# Patient Record
Sex: MALE | Employment: OTHER | URBAN - METROPOLITAN AREA
[De-identification: names, ages, dates, MRNs, and addresses within clinical notes are randomized per-mention and may not be internally consistent; named-entity substitution may affect disease eponyms.]

---

## 2017-01-13 ENCOUNTER — TELEPHONE (OUTPATIENT)
Dept: INTERNAL MEDICINE CLINIC | Age: 82
End: 2017-01-13

## 2017-01-13 NOTE — TELEPHONE ENCOUNTER
Patients son, Ann Reynolds,  called concerned for his father. His dementia has gotten worse and now his vision is being affected. He would like for Dr Jill Santos to give him a call back so he can explain to him what is going on with his father.   Please call him at [phone]

## 2017-01-16 ENCOUNTER — TELEPHONE (OUTPATIENT)
Dept: INTERNAL MEDICINE CLINIC | Age: 82
End: 2017-01-16

## 2017-01-16 NOTE — TELEPHONE ENCOUNTER
Called and spoke with son and informed him that per Dr Joseline Moser patient needs to be seen and he could come into the office if able or Dr Joseline Moser could do a house call. Son voiced understanding and agreed to either but stated that he could not bring patient in until next week. Appointment has been scheduled for 1/23 at 945 and if Dr Joseline Moser would like to do a house call on patient before that date son will be made aware.

## 2017-01-16 NOTE — TELEPHONE ENCOUNTER
Patients son would like Dr Olivas Enter to call concerning the patient .       Hermilo Blanton  [phone]

## 2017-01-23 ENCOUNTER — HOSPITAL ENCOUNTER (OUTPATIENT)
Dept: LAB | Age: 82
Discharge: HOME | End: 2017-01-23
Payer: MEDICARE

## 2017-01-23 ENCOUNTER — OFFICE VISIT (OUTPATIENT)
Dept: INTERNAL MEDICINE CLINIC | Age: 82
End: 2017-01-23

## 2017-01-23 DIAGNOSIS — E03.9 ACQUIRED HYPOTHYROIDISM: ICD-10-CM

## 2017-01-23 DIAGNOSIS — R41.3 MEMORY LOSS: ICD-10-CM

## 2017-01-23 DIAGNOSIS — I10 BENIGN ESSENTIAL HYPERTENSION: ICD-10-CM

## 2017-01-23 DIAGNOSIS — Z00.00 ROUTINE GENERAL MEDICAL EXAMINATION AT A HEALTH CARE FACILITY: ICD-10-CM

## 2017-01-23 DIAGNOSIS — R41.3 MEMORY LOSS: Primary | ICD-10-CM

## 2017-01-23 DIAGNOSIS — Z13.39 SCREENING FOR ALCOHOLISM: ICD-10-CM

## 2017-01-23 DIAGNOSIS — E78.00 PURE HYPERCHOLESTEROLEMIA: ICD-10-CM

## 2017-01-23 LAB
ALBUMIN SERPL BCP-MCNC: 3.8 G/DL (ref 3.4–5)
ALBUMIN/GLOB SERPL: 1.4 {RATIO} (ref 0.8–1.7)
ALP SERPL-CCNC: 117 U/L (ref 45–117)
ALT SERPL-CCNC: 26 U/L (ref 16–61)
ANION GAP BLD CALC-SCNC: 8 MMOL/L (ref 3–18)
AST SERPL W P-5'-P-CCNC: 13 U/L (ref 15–37)
BASOPHILS # BLD AUTO: 0 K/UL (ref 0–0.06)
BASOPHILS # BLD: 0 % (ref 0–2)
BILIRUB SERPL-MCNC: 0.6 MG/DL (ref 0.2–1)
BUN SERPL-MCNC: 12 MG/DL (ref 7–18)
BUN/CREAT SERPL: 12 (ref 12–20)
CALCIUM SERPL-MCNC: 8.6 MG/DL (ref 8.5–10.1)
CHLORIDE SERPL-SCNC: 98 MMOL/L (ref 100–108)
CO2 SERPL-SCNC: 30 MMOL/L (ref 21–32)
CREAT SERPL-MCNC: 0.97 MG/DL (ref 0.6–1.3)
DIFFERENTIAL METHOD BLD: ABNORMAL
EOSINOPHIL # BLD: 0.2 K/UL (ref 0–0.4)
EOSINOPHIL NFR BLD: 4 % (ref 0–5)
ERYTHROCYTE [DISTWIDTH] IN BLOOD BY AUTOMATED COUNT: 13.3 % (ref 11.6–14.5)
GLOBULIN SER CALC-MCNC: 2.8 G/DL (ref 2–4)
GLUCOSE SERPL-MCNC: 77 MG/DL (ref 74–99)
HCT VFR BLD AUTO: 39.9 % (ref 36–48)
HGB BLD-MCNC: 13.1 G/DL (ref 13–16)
LYMPHOCYTES # BLD AUTO: 20 % (ref 21–52)
LYMPHOCYTES # BLD: 0.9 K/UL (ref 0.9–3.6)
MCH RBC QN AUTO: 33.8 PG (ref 24–34)
MCHC RBC AUTO-ENTMCNC: 32.8 G/DL (ref 31–37)
MCV RBC AUTO: 102.8 FL (ref 74–97)
MONOCYTES # BLD: 0.4 K/UL (ref 0.05–1.2)
MONOCYTES NFR BLD AUTO: 8 % (ref 3–10)
NEUTS SEG # BLD: 3 K/UL (ref 1.8–8)
NEUTS SEG NFR BLD AUTO: 68 % (ref 40–73)
PLATELET # BLD AUTO: 134 K/UL (ref 135–420)
PMV BLD AUTO: 10.9 FL (ref 9.2–11.8)
POTASSIUM SERPL-SCNC: 3.9 MMOL/L (ref 3.5–5.5)
PROT SERPL-MCNC: 6.6 G/DL (ref 6.4–8.2)
RBC # BLD AUTO: 3.88 M/UL (ref 4.7–5.5)
SODIUM SERPL-SCNC: 136 MMOL/L (ref 136–145)
TSH SERPL DL<=0.05 MIU/L-ACNC: 3.73 UIU/ML (ref 0.36–3.74)
WBC # BLD AUTO: 4.5 K/UL (ref 4.6–13.2)

## 2017-01-23 PROCEDURE — 80053 COMPREHEN METABOLIC PANEL: CPT | Performed by: INTERNAL MEDICINE

## 2017-01-23 PROCEDURE — 85025 COMPLETE CBC W/AUTO DIFF WBC: CPT | Performed by: INTERNAL MEDICINE

## 2017-01-23 PROCEDURE — 84443 ASSAY THYROID STIM HORMONE: CPT | Performed by: INTERNAL MEDICINE

## 2017-01-23 RX ORDER — MEMANTINE HYDROCHLORIDE 14 MG/1
CAPSULE, EXTENDED RELEASE ORAL
Qty: 30 CAP | Refills: 2 | Status: SHIPPED | OUTPATIENT
Start: 2017-01-23 | End: 2019-03-05 | Stop reason: ALTCHOICE

## 2017-01-23 NOTE — PROGRESS NOTES
The patient presents to the office today with the chief complaint of memory loss    HPI    The patient has memory loss most consistent with Alzheimer's Disease. The patient's mental status is deteriorating. The patient remains on medications for hypothyroidism and hypertension. The patient has hyperlipidemia - which currently is being treated with a diet. Review of Systems   Constitutional: Positive for malaise/fatigue. Respiratory: Negative for shortness of breath. Cardiovascular: Positive for leg swelling. Negative for chest pain. Allergies   Allergen Reactions    Avelox [Moxifloxacin] Rash    Clindamycin Rash    Penicillin V Potassium Rash       Current Outpatient Prescriptions   Medication Sig Dispense Refill    amLODIPine (NORVASC) 5 mg tablet take 1 tablet by mouth once daily 30 Tab 2    levothyroxine (SYNTHROID) 25 mcg tablet take 1 tablet by mouth every morning ON AN EMPTY STOMACH 30 Tab 2    FLUZONE HIGH-DOSE 2015-16, PF, syrg injection   0       Past Medical History   Diagnosis Date    Asbestosis ([facility] [place] [address]     Benign essential hypertension     Hyperplasia of prostate with urinary obstruction     Memory loss     Pure hypercholesterolemia        Past Surgical History   Procedure Laterality Date    Hx appendectomy      Hx cholecystectomy  7/8/1997    Hx other surgical  10/1999     repair of abdominal aortic aneursym       Social History     Social History    Marital status: UNKNOWN     Spouse name: N/A    Number of children: N/A    Years of education: N/A     Occupational History    Not on file. Social History Main Topics    Smoking status: Former Smoker    Smokeless tobacco: Never Used    Alcohol use No    Drug use: No    Sexual activity: No     Other Topics Concern    Not on file     Social History Narrative       Patient does not have an advanced directive on file    There were no vitals taken for this visit.     Physical Exam   Neck: Carotid bruit is not present. No thyromegaly present. Cardiovascular: Normal rate and regular rhythm. Exam reveals no gallop. No murmur heard. Pulmonary/Chest: He has no wheezes. He has no rales. Abdominal: Soft. Bowel sounds are normal. He exhibits no distension and no mass. There is no splenomegaly or hepatomegaly. There is no tenderness. Musculoskeletal: He exhibits no edema. Lymphadenopathy:     He has no cervical adenopathy. Right: No supraclavicular adenopathy present. Left: No supraclavicular adenopathy present. No visits with results within 3 Month(s) from this visit. Latest known visit with results is:    Hospital Outpatient Visit on 06/13/2016   Component Date Value Ref Range Status    LIPID PROFILE 06/13/2016        Final    Cholesterol, total 06/13/2016 165  <200 MG/DL Final    Triglyceride 06/13/2016 138  <150 MG/DL Final    HDL Cholesterol 06/13/2016 40  40 - 60 MG/DL Final    LDL, calculated 06/13/2016 97.4  0 - 100 MG/DL Final    VLDL, calculated 06/13/2016 27.6  MG/DL Final    CHOL/HDL Ratio 06/13/2016 4.1  0 - 5.0   Final    WBC 06/13/2016 5.5  4.6 - 13.2 K/uL Final    RBC 06/13/2016 4.08* 4.70 - 5.50 M/uL Final    HGB 06/13/2016 13.2  13.0 - 16.0 g/dL Final    HCT 06/13/2016 41.3  36.0 - 48.0 % Final    MCV 06/13/2016 101.2* 74.0 - 97.0 FL Final    MCH 06/13/2016 32.4  24.0 - 34.0 PG Final    MCHC 06/13/2016 32.0  31.0 - 37.0 g/dL Final    RDW 06/13/2016 13.0  11.6 - 14.5 % Final    PLATELET 56/72/4851 509* 135 - 420 K/uL Final    MPV 06/13/2016 11.7  9.2 - 11.8 FL Final    NEUTROPHILS 06/13/2016 63  40 - 73 % Final    LYMPHOCYTES 06/13/2016 21  21 - 52 % Final    MONOCYTES 06/13/2016 12* 3 - 10 % Final    EOSINOPHILS 06/13/2016 4  0 - 5 % Final    BASOPHILS 06/13/2016 0  0 - 2 % Final    ABS. NEUTROPHILS 06/13/2016 3.5  1.8 - 8.0 K/UL Final    ABS. LYMPHOCYTES 06/13/2016 1.2  0.9 - 3.6 K/UL Final    ABS.  MONOCYTES 06/13/2016 0.7  0.05 - 1.2 K/UL Final    ABS. EOSINOPHILS 06/13/2016 0.2  0.0 - 0.4 K/UL Final    ABS. BASOPHILS 06/13/2016 0.0  0.0 - 0.06 K/UL Final    DF 06/13/2016 AUTOMATED    Final    Sodium 06/13/2016 140  136 - 145 mmol/L Final    Potassium 06/13/2016 4.5  3.5 - 5.5 mmol/L Final    Chloride 06/13/2016 101  100 - 108 mmol/L Final    CO2 06/13/2016 32  21 - 32 mmol/L Final    Anion gap 06/13/2016 7  3.0 - 18 mmol/L Final    Glucose 06/13/2016 83  74 - 99 mg/dL Final    BUN 06/13/2016 15  7.0 - 18 MG/DL Final    Creatinine 06/13/2016 0.95  0.6 - 1.3 MG/DL Final    BUN/Creatinine ratio 06/13/2016 16  12 - 20   Final    GFR est AA 06/13/2016 >60  >60 ml/min/1.73m2 Final    GFR est non-AA 06/13/2016 >60  >60 ml/min/1.73m2 Final    Comment: (NOTE)  Estimated GFR is calculated using the Modification of Diet in Renal   Disease (MDRD) Study equation, reported for both  Americans   (GFRAA) and non- Americans (GFRNA), and normalized to 1.73m2   body surface area. The physician must decide which value applies to   the patient. The MDRD study equation should only be used in   individuals age 25 or older. It has not been validated for the   following: pregnant women, patients with serious comorbid conditions,   or on certain medications, or persons with extremes of body size,   muscle mass, or nutritional status.  Calcium 06/13/2016 8.7  8.5 - 10.1 MG/DL Final    Bilirubin, total 06/13/2016 0.5  0.2 - 1.0 MG/DL Final    ALT 06/13/2016 21  16 - 61 U/L Final    AST 06/13/2016 16  15 - 37 U/L Final    Alk. phosphatase 06/13/2016 98  45 - 117 U/L Final    Protein, total 06/13/2016 6.5  6.4 - 8.2 g/dL Final    Albumin 06/13/2016 3.8  3.4 - 5.0 g/dL Final    Globulin 06/13/2016 2.7  2.0 - 4.0 g/dL Final    A-G Ratio 06/13/2016 1.4  0.8 - 1.7   Final    TSH 06/13/2016 4.98* 0.36 - 3.74 uIU/mL Final       .No results found for any visits on 01/23/17. Assessment / Plan      ICD-10-CM ICD-9-CM    1. Memory loss R41.3 780.93    2. Benign essential hypertension I10 401.1    3. Pure hypercholesterolemia E78.00 272.0    4. Acquired hypothyroidism E03.9 244.9            Follow-up Disposition: Not on File    I asked the patient and his son for any questions and I answered their questions.   The patient and his son state that they understand the treatment plan and agree with the treatment plan

## 2017-01-23 NOTE — PATIENT INSTRUCTIONS
Medicare Part B Preventive Services Limitations Recommendation Scheduled   Bone Mass Measurement  (age 72 & older, biennial) Requires diagnosis related to osteoporosis or estrogen deficiency. Biennial benefit unless patient has history of long-term glucocorticoid tx or baseline is needed because initial test was by other method     Cardiovascular Screening Blood Tests (every 5 years)  Total cholesterol, HDL, Triglycerides Order as a panel if possible     Colorectal Cancer Screening  -Fecal occult blood test (annual)  -Flexible sigmoidoscopy (5y)  -Screening colonoscopy (10y)  -Barium Enema      Counseling to Prevent Tobacco Use (up to 8 sessions per year)  - Counseling greater than 3 and up to 10 minutes  - Counseling greater than 10 minutes Patients must be asymptomatic of tobacco-related conditions to receive as preventive service     Diabetes Screening Tests (at least every 3 years, Medicare covers annually or at 6-month intervals for prediabetic patients)    Fasting blood sugar (FBS) or glucose tolerance test (GTT) Patient must be diagnosed with one of the following:  -Hypertension, Dyslipidemia, obesity, previous impaired FBS or GTT  Or any two of the following: overweight, FH of diabetes, age ? 72, history of gestational diabetes, birth of baby weighing more than 9 pounds     Diabetes Self-Management Training (DSMT) (no USPSTF recommendation) Requires referral by treating physician for patient with diabetes or renal disease. 10 hours of initial DSMT session of no less than 30 minutes each in a continuous 12-month period. 2 hours of follow-up DSMT in subsequent years.      Glaucoma Screening (no USPSTF recommendation) Diabetes mellitus, family history, , age 48 or over,  American, age 72 or over     Human Immunodeficiency Virus (HIV) Screening (annually for increased risk patients)  HIV-1 and HIV-2 by EIA, TABATHA, rapid antibody test, or oral mucosa transudate Patient must be at increased risk for HIV infection per USPSTF guidelines or pregnant. Tests covered annually for patients at increased risk. Pregnant patients may receive up to 3 test during pregnancy. Medical Nutrition Therapy (MNT) (for diabetes or renal disease not recommended schedule) Requires referral by treating physician for patient with diabetes or renal disease. Can be provided in same year as diabetes self-management training (DSMT), and CMS recommends medical nutrition therapy take place after DSMT. Up to 3 hours for initial year and 2 hours in subsequent years. Prostate Cancer Screening (annually up to age 76)  - Digital rectal exam (BRANDY)  - Prostate specific antigen (PSA) Annually (age 48 or over), BRANDY not paid separately when covered E/M service is provided on same date  Men up to age 76 may need a screening blood test for prostate cancer at certain intervals, depending on their personal and family history. This decision is between the patient and his provider. Seasonal Influenza Vaccination (annually)        Pneumococcal Vaccination (once after 72)      Hepatitis B Vaccinations (if medium/high risk) Medium/high risk factors:  End-stage renal disease,  Hemophiliacs who received Factor VIII or IX concentrates, Clients of institutions for the mentally retarded, Persons who live in the same house as a HepB virus carrier, Homosexual men, Illicit injectable drug abusers. Shingles Vaccination A shingles vaccine is also recommended once in a lifetime after age 61     Ultrasound Screening for Abdominal Aortic Aneurysm (AAA) (once) Patient must be referred through [facility] and not have had a screening for abdominal aortic aneurysm before under Medicare.   Limited to patients who meet one of the following criteria:  - Men who are 73-68 years old and have smoked more than 100 cigarettes in their lifetime.  -Anyone with a FH of AAA  -Anyone recommended for screening by USPSTF

## 2017-01-23 NOTE — MR AVS SNAPSHOT
Visit Information Date & Time Provider Department Dept. Phone Encounter #  
 1/23/2017  9:45 AM Tatiana Johnson MD [facility] INTERNAL MEDICINE OF [address] [place] [address] Drive [phone] [id number] Your Appointments 5/15/2017 10:30 AM  
Follow Up with Tatiana Johnson MD  
[address] [facility]) Appt Note: 5mo  
 [address], [address]  
  
   
 [address], [address] [place] [address] Upcoming Health Maintenance Date Due DTaP/Tdap/Td series (1 - Tdap) 9/13/1943 ZOSTER VACCINE AGE 60> 9/13/1982 GLAUCOMA SCREENING Q2Y 9/13/1987 Pneumococcal 65+ Low/Medium Risk (1 of 2 - PCV13) 9/13/1987 MEDICARE YEARLY EXAM 9/13/1987 Allergies as of 1/23/2017  Review Complete On: 1/23/2017 By: Tatiana Johnson MD  
  
 Severity Noted Reaction Type Reactions Avelox [Moxifloxacin]    Rash Clindamycin    Rash Penicillin V Potassium    Rash Current Immunizations  Never Reviewed Name Date Influenza High Dose Vaccine PF 10/11/2016 Not reviewed this visit You Were Diagnosed With   
  
 Codes Comments Memory loss    -  Primary ICD-10-CM: R41.3 ICD-9-CM: 780.93 Benign essential hypertension     ICD-10-CM: I10 
ICD-9-CM: 401.1 Pure hypercholesterolemia     ICD-10-CM: E78.00 ICD-9-CM: 272.0 Acquired hypothyroidism     ICD-10-CM: E03.9 ICD-9-CM: 334. 9 Vitals Smoking Status Former Smoker Preferred Pharmacy Pharmacy Name Phone [address], [address] [place] [address] [phone] Your Updated Medication List  
  
   
This list is accurate as of: 1/23/17 11:33 AM.  Always use your most recent med list. amLODIPine 5 mg tablet Commonly known as:  NORVASC  
take 1 tablet by mouth once daily FLUZONE HIGH-DOSE 2015-16 (PF) Syrg injection Generic drug:  influenza vaccine  (65yr+)(PF)  
  
 levothyroxine 25 mcg tablet Commonly known as:  SYNTHROID  
take 1 tablet by mouth every morning ON AN EMPTY STOMACH  
  
 memantine ER 14 mg capsule Commonly known as:  NAMENDA XR  
1 tablet daily Prescriptions Sent to Pharmacy Refills  
 memantine ER (NAMENDA XR) 14 mg capsule 2 Si tablet daily Class: Normal  
 [facility] #: [phone] Introducing [facility]! Simón Terrazas introduces PicRate.Me patient portal. Now you can access parts of your medical record, email your doctor's office, and request medication refills online. 1. In your internet browser, go to [email or web address] 2. Click on the First Time User? Click Here link in the Sign In box. You will see the New Member Sign Up page. 3. Enter your PicRate.Me Access Code exactly as it appears below. You will not need to use this code after youve completed the sign-up process. If you do not sign up before the expiration date, you must request a new code. · PicRate.Me Access Code: [email or web address] Expires: 2017 11:26 AM 
 
4. Enter the last four digits of your Social Security Number (xxxx) and Date of Birth (mm/dd/yyyy) as indicated and click Submit. You will be taken to the next sign-up page. 5. Create a PicRate.Me ID. This will be your PicRate.Me login ID and cannot be changed, so think of one that is secure and easy to remember. 6. Create a PicRate.Me password. You can change your password at any time. 7. Enter your Password Reset Question and Answer. This can be used at a later time if you forget your password. 8. Enter your e-mail address. You will receive e-mail notification when new information is available in [address] 9. Click Sign Up. You can now view and download portions of your medical record. 10. Click the Download Summary menu link to download a portable copy of your medical information. If you have questions, please visit the Frequently Asked Questions section of the freee website. Remember, freee is NOT to be used for urgent needs. For medical emergencies, dial 911. Now available from your iPhone and Android! Please provide this summary of care documentation to your next provider. Your primary care clinician is listed as Karlos Camacho. If you have any questions after today's visit, please call [phone].

## 2017-01-23 NOTE — PROGRESS NOTES
This is a Subsequent Medicare Annual Wellness Visit providing Personalized Prevention Plan Services (PPPS) (Performed 12 months after initial AWV and PPPS )    I have reviewed the patient's medical history in detail and updated the computerized patient record. History     Patient has worsening dementia. His immediate memory is bad. He is often asking when his  wife will return. The patient in on medications for hypothyroidism and hypertension. The patient's family makes sure that he takes them properly.       Past Medical History   Diagnosis Date    [facility]bestosis ([facility] [address]     Benign essential hypertension     Hyperplasia of prostate with urinary obstruction     Memory loss     Pure hypercholesterolemia       Past Surgical History   Procedure Laterality Date    Hx appendectomy      Hx cholecystectomy  1997    Hx other surgical  10/1999     repair of abdominal aortic aneursym     Current Outpatient Prescriptions   Medication Sig Dispense Refill    memantine ER (NAMENDA XR) 14 mg capsule 1 tablet daily 30 Cap 2    amLODIPine (NORVASC) 5 mg tablet take 1 tablet by mouth once daily 30 Tab 2    levothyroxine (SYNTHROID) 25 mcg tablet take 1 tablet by mouth every morning ON AN EMPTY STOMACH 30 Tab 2    FLUZONE HIGH-DOSE , PF, syrg injection   0     Allergies   Allergen Reactions    Avelox [Moxifloxacin] Rash    Clindamycin Rash    Penicillin V Potassium Rash     Family History   Problem Relation Age of Onset    No Known Problems Mother     No Known Problems Father      Social History   Substance Use Topics    Smoking status: Former Smoker    Smokeless tobacco: Never Used    Alcohol use No     Patient Active Problem List   Diagnosis Code    Memory loss R41.3    Hyperplasia of prostate with urinary obstruction N40.1, N13.8    Asbestosis ([address] J61    Benign essential hypertension I10    Pure hypercholesterolemia E78.00    Acquired hypothyroidism E03.9       Depression Risk Factor Screening:     PHQ 2 / 9, over the last two weeks 1/24/2017   Little interest or pleasure in doing things Several days   Feeling down, depressed or hopeless Several days   Total Score PHQ 2 2     Alcohol Risk Factor Screening: On any occasion during the past 3 months, have you had more than 4 drinks containing alcohol? No    Do you average more than 14 drinks per week? No      Functional Ability and Level of Safety:     Hearing Loss   mild-to-moderate    Activities of Daily Living   Self-care. Requires assistance with: no ADLs    Fall Risk     Fall Risk Assessment, last 12 mths 6/3/2016   Able to walk? Yes   Fall in past 12 months? No   Fall with injury? -   Number of falls in past 12 months -   Fall Risk Score -     Abuse Screen   Patient is not abused    Review of Systems   A comprehensive review of systems was negative except for that written in the HPI. Physical Examination     Evaluation of Cognitive Function:  Mood/affect:  neutral  Appearance: age appropriate  Family member/caregiver input: Son    Visit Vitals    /78 (BP 1 Location: Left arm, BP Patient Position: Sitting)    Pulse 68    Ht 5' 4\" (1.626 m)    Wt 152 lb (68.9 kg)    BMI 26.09 kg/m2     General appearance: appears stated age  Neck: supple, symmetrical, trachea midline, no adenopathy, thyroid: not enlarged, symmetric, no tenderness/mass/nodules, no carotid bruit and no JVD  Back: symmetric, no curvature. ROM normal. No CVA tenderness.   Lungs: clear to auscultation bilaterally  Chest wall: no tenderness  Heart: regular rate and rhythm, S1, S2 normal, no murmur, click, rub or gallop  Extremities: extremities normal, atraumatic, no cyanosis or edema  Pulses: 2+ and symmetric  Lymph nodes: Cervical, supraclavicular, and axillary nodes normal.    Patient Care Team:  Romie Adam MD as PCP - General (Internal Medicine)    Advice/Referrals/Counseling   Education and counseling provided:  Are appropriate based on today's review and evaluation  The patient and son were advised and counseled regarding advanced directives  Assessment/Plan       ICD-10-CM ICD-9-CM    1. Memory loss R41.3 780.93 CBC WITH AUTOMATED DIFF      METABOLIC PANEL, COMPREHENSIVE      TSH 3RD GENERATION      FL COLLECTION VENOUS BLOOD,VENIPUNCTURE   2. Benign essential hypertension I10 401.1 CBC WITH AUTOMATED DIFF      METABOLIC PANEL, COMPREHENSIVE      TSH 3RD GENERATION      FL COLLECTION VENOUS BLOOD,VENIPUNCTURE   3. Pure hypercholesterolemia E78.00 272.0 CBC WITH AUTOMATED DIFF      METABOLIC PANEL, COMPREHENSIVE      TSH 3RD GENERATION      FL COLLECTION VENOUS BLOOD,VENIPUNCTURE   4. Acquired hypothyroidism E03.9 244.9 CBC WITH AUTOMATED DIFF      METABOLIC PANEL, COMPREHENSIVE      TSH 3RD GENERATION      FL COLLECTION VENOUS BLOOD,VENIPUNCTURE   5. Routine general medical examination at a health care facility Z00.00 V70.0 FL COLLECTION VENOUS BLOOD,VENIPUNCTURE   6. Screening for alcoholism Z13.89 V79.1 FL COLLECTION VENOUS BLOOD,VENIPUNCTURE   . Add Namenda  he was advised to continue his maintenance medications  I asked the patient and his son for any questions and I answered their questions.   The patient and his osn state that they understand the treatment plan and agree with the treatment plan

## 2017-01-24 VITALS
BODY MASS INDEX: 25.95 KG/M2 | HEART RATE: 68 BPM | DIASTOLIC BLOOD PRESSURE: 78 MMHG | HEIGHT: 64 IN | SYSTOLIC BLOOD PRESSURE: 132 MMHG | WEIGHT: 152 LBS

## 2017-02-21 DIAGNOSIS — E03.9 HYPOTHYROIDISM, UNSPECIFIED TYPE: ICD-10-CM

## 2017-02-21 DIAGNOSIS — I10 BENIGN ESSENTIAL HYPERTENSION: ICD-10-CM

## 2017-02-21 RX ORDER — AMLODIPINE BESYLATE 5 MG/1
TABLET ORAL
Qty: 30 TAB | Refills: 2 | Status: SHIPPED | OUTPATIENT
Start: 2017-02-21 | End: 2017-05-24 | Stop reason: SDUPTHER

## 2017-02-21 RX ORDER — LEVOTHYROXINE SODIUM 25 UG/1
TABLET ORAL
Qty: 30 TAB | Refills: 2 | Status: SHIPPED | OUTPATIENT
Start: 2017-02-21 | End: 2017-05-24 | Stop reason: SDUPTHER

## 2017-05-09 RX ORDER — MEMANTINE HYDROCHLORIDE 10 MG/1
TABLET ORAL
Qty: 60 TAB | Refills: 3 | Status: SHIPPED | OUTPATIENT
Start: 2017-05-09 | End: 2017-11-22 | Stop reason: SDUPTHER

## 2017-05-09 RX ORDER — MEMANTINE HYDROCHLORIDE 14 MG/1
CAPSULE, EXTENDED RELEASE ORAL
Qty: 30 CAP | Refills: 2 | OUTPATIENT
Start: 2017-05-09

## 2017-05-19 ENCOUNTER — CLINICAL SUPPORT (OUTPATIENT)
Dept: INTERNAL MEDICINE CLINIC | Age: 82
End: 2017-05-19

## 2017-05-19 VITALS — SYSTOLIC BLOOD PRESSURE: 150 MMHG | DIASTOLIC BLOOD PRESSURE: 78 MMHG

## 2017-05-19 DIAGNOSIS — I10 BENIGN ESSENTIAL HYPERTENSION: Primary | ICD-10-CM

## 2017-05-19 NOTE — PROGRESS NOTES
Patient came into the office today for a blood pressure check. Blood pressure was checked in the right arm at 150/78. Patient able to leave office ambulatory.

## 2017-05-24 DIAGNOSIS — E03.9 HYPOTHYROIDISM, UNSPECIFIED TYPE: ICD-10-CM

## 2017-05-24 DIAGNOSIS — I10 BENIGN ESSENTIAL HYPERTENSION: ICD-10-CM

## 2017-05-24 RX ORDER — LEVOTHYROXINE SODIUM 25 UG/1
TABLET ORAL
Qty: 30 TAB | Refills: 2 | Status: SHIPPED | OUTPATIENT
Start: 2017-05-24 | End: 2017-08-26 | Stop reason: SDUPTHER

## 2017-05-24 RX ORDER — AMLODIPINE BESYLATE 5 MG/1
TABLET ORAL
Qty: 30 TAB | Refills: 2 | Status: SHIPPED | OUTPATIENT
Start: 2017-05-24 | End: 2017-08-26 | Stop reason: SDUPTHER

## 2017-08-26 DIAGNOSIS — E03.9 HYPOTHYROIDISM, UNSPECIFIED TYPE: ICD-10-CM

## 2017-08-26 DIAGNOSIS — I10 BENIGN ESSENTIAL HYPERTENSION: ICD-10-CM

## 2017-08-28 RX ORDER — LEVOTHYROXINE SODIUM 25 UG/1
TABLET ORAL
Qty: 30 TAB | Refills: 2 | Status: SHIPPED | OUTPATIENT
Start: 2017-08-28 | End: 2017-11-19 | Stop reason: SDUPTHER

## 2017-08-28 RX ORDER — AMLODIPINE BESYLATE 5 MG/1
TABLET ORAL
Qty: 30 TAB | Refills: 2 | Status: SHIPPED | OUTPATIENT
Start: 2017-08-28 | End: 2017-11-19 | Stop reason: SDUPTHER

## 2017-09-19 ENCOUNTER — OFFICE VISIT (OUTPATIENT)
Dept: INTERNAL MEDICINE CLINIC | Age: 82
End: 2017-09-19

## 2017-09-19 VITALS
TEMPERATURE: 97.9 F | DIASTOLIC BLOOD PRESSURE: 70 MMHG | HEART RATE: 81 BPM | HEIGHT: 64 IN | BODY MASS INDEX: 25.95 KG/M2 | RESPIRATION RATE: 16 BRPM | WEIGHT: 152 LBS | SYSTOLIC BLOOD PRESSURE: 142 MMHG | OXYGEN SATURATION: 100 %

## 2017-09-19 DIAGNOSIS — Z23 ENCOUNTER FOR IMMUNIZATION: ICD-10-CM

## 2017-09-19 DIAGNOSIS — R51.9 HEADACHE, UNSPECIFIED HEADACHE TYPE: Primary | ICD-10-CM

## 2017-09-19 DIAGNOSIS — I10 BENIGN ESSENTIAL HYPERTENSION: ICD-10-CM

## 2017-09-19 DIAGNOSIS — R41.3 MEMORY LOSS: ICD-10-CM

## 2017-09-19 RX ORDER — MEGESTROL ACETATE 40 MG/1
TABLET ORAL
Qty: 30 TAB | Refills: 3 | Status: SHIPPED | OUTPATIENT
Start: 2017-09-19 | End: 2020-06-25

## 2017-09-19 NOTE — MR AVS SNAPSHOT
Visit Information Date & Time Provider Department Dept. Phone Encounter #  
 9/19/2017 10:15 AM Marleny Mccrary MD [facility] INTERNAL MEDICINE OF Glynis Pallas [phone] [id number] Upcoming Health Maintenance Date Due DTaP/Tdap/Td series (1 - Tdap) 9[birth year]13/1943 ZOSTER VACCINE AGE 60> 7/13/1982 GLAUCOMA SCREENING Q2Y 9/13/1987 Pneumococcal 65+ Low/Medium Risk (2 of 2 - PCV13) 10/12/2006 INFLUENZA AGE 9 TO ADULT 8/1/2017 MEDICARE YEARLY EXAM 1/24/2018 Allergies as of 9/19/2017  Review Complete On: 9/19/2017 By: Bela Foote LPN Severity Noted Reaction Type Reactions Avelox [Moxifloxacin]    Rash Clindamycin    Rash Penicillin V Potassium    Rash Current Immunizations  Reviewed on 5/10/2017 Name Date Influenza High Dose Vaccine PF  Incomplete, 10/11/2016 Pneumococcal Polysaccharide (PPSV-23) 10/12/2005 Not reviewed this visit You Were Diagnosed With   
  
 Codes Comments Headache, unspecified headache type    -  Primary ICD-10-CM: R51 ICD-9-CM: 784.0 Encounter for immunization     ICD-10-CM: H84 ICD-9-CM: V03.89 Memory loss     ICD-10-CM: R41.3 ICD-9-CM: 780.93 Benign essential hypertension     ICD-10-CM: I10 
ICD-9-CM: 401.1 Vitals BP Pulse Temp Resp Height(growth percentile) 142/70 (BP 1 Location: Left arm, BP Patient Position: Sitting) 81 97.9 °F (36.6 °C) (Tympanic) 16 5' 4\" (1.626 m) Weight(growth percentile) SpO2 BMI Smoking Status 152 lb (68.9 kg) 100% 26.09 kg/m2 Former Smoker BMI and BSA Data Body Mass Index Body Surface Area 26.09 kg/m 2 1.76 m 2 Preferred Pharmacy Pharmacy Name Phone [address], [address] [place] [address] [phone] Your Updated Medication List  
  
   
This list is accurate as of: 9/19/17 11:47 AM.  Always use your most recent med list. amLODIPine 5 mg tablet Commonly known as:  NORVASC  
take 1 tablet by mouth once daily FLUZONE HIGH-DOSE  (PF) Syrg injection Generic drug:  influenza vaccine  (65yr+)(PF)  
  
 levothyroxine 25 mcg tablet Commonly known as:  SYNTHROID  
take 1 tablet by mouth every morning ON AN EMPTY STOMACH  
  
 megestrol 40 mg tablet Commonly known as:  MEGACE  
1 tablet daily * memantine ER 14 mg capsule Commonly known as:  NAMENDA XR  
1 tablet daily * memantine 10 mg tablet Commonly known as:  NAMENDA  
1 tablet twice per day * Notice: This list has 2 medication(s) that are the same as other medications prescribed for you. Read the directions carefully, and ask your doctor or other care provider to review them with you. Prescriptions Sent to Pharmacy Refills  
 megestrol (MEGACE) 40 mg tablet 3 Si tablet daily Class: Normal  
 Pharmacy: [facility] #: [phone] We Performed the Following ADMIN INFLUENZA VIRUS VAC [ [facility] [place]] INFLUENZA VIRUS VACCINE, HIGH DOSE SEASONAL, PRESERVATIVE FREE [71832 CPT(R)] To-Do List   
 2017 Lab:  C REACTIVE PROTEIN, QT   
  
 2017 Lab:  METABOLIC PANEL, COMPREHENSIVE   
  
 2017 Lab:  SED RATE (ESR)   
  
 2018 Lab:  CBC WITH AUTOMATED DIFF Patient Instructions Health Maintenance Due Topic Date Due  
 DTaP/Tdap/Td  (1 - Tdap) 1943  Shingles Vaccine  1982  Glaucoma Screening   1987  Pneumococcal Vaccine (2 of 2 - PCV13) 10/12/2006  Flu Vaccine  2017 Introducing [facility]! Sunil Linton introduces SuperSecret patient portal. Now you can access parts of your medical record, email your doctor's office, and request medication refills online. 1. In your internet browser, go to [email or web address] 2. Click on the First Time User? Click Here link in the Sign In box. You will see the New Member Sign Up page. 3. Enter your Urgent.ly Access Code exactly as it appears below. You will not need to use this code after youve completed the sign-up process. If you do not sign up before the expiration date, you must request a new code. · Urgent.ly Access Code: [email or web address] Expires: 12/18/2017 11:06 AM 
 
4. Enter the last four digits of your Social Security Number (xxxx) and Date of Birth (mm/dd/yyyy) as indicated and click Submit. You will be taken to the next sign-up page. 5. Create a Urgent.ly ID. This will be your Urgent.ly login ID and cannot be changed, so think of one that is secure and easy to remember. 6. Create a Urgent.ly password. You can change your password at any time. 7. Enter your Password Reset Question and Answer. This can be used at a later time if you forget your password. 8. Enter your e-mail address. You will receive e-mail notification when new information is available in [address] 9. Click Sign Up. You can now view and download portions of your medical record. 10. Click the Download Summary menu link to download a portable copy of your medical information. If you have questions, please visit the Frequently Asked Questions section of the Urgent.ly website. Remember, Urgent.ly is NOT to be used for urgent needs. For medical emergencies, dial 911. Now available from your iPhone and Android! Please provide this summary of care documentation to your next provider. Your primary care clinician is listed as Leslye Bonilla. If you have any questions after today's visit, please call [phone].

## 2017-09-19 NOTE — PROGRESS NOTES
1. Have you been to the ER, urgent care clinic since your last visit? Hospitalized since your last visit? No    2. Have you seen or consulted any other health care providers outside of the [address] [place] [address] since your last visit? Include any pap smears or colon screening.  No

## 2017-09-22 ENCOUNTER — OFFICE VISIT (OUTPATIENT)
Dept: INTERNAL MEDICINE CLINIC | Age: 82
End: 2017-09-22

## 2017-09-22 VITALS
SYSTOLIC BLOOD PRESSURE: 130 MMHG | WEIGHT: 152 LBS | DIASTOLIC BLOOD PRESSURE: 70 MMHG | TEMPERATURE: 97.4 F | OXYGEN SATURATION: 98 % | RESPIRATION RATE: 16 BRPM | HEART RATE: 87 BPM | HEIGHT: 64 IN | BODY MASS INDEX: 25.95 KG/M2

## 2017-09-22 DIAGNOSIS — R21 RASH: Primary | ICD-10-CM

## 2017-09-22 RX ORDER — DOXYCYCLINE 100 MG/1
100 TABLET ORAL 2 TIMES DAILY
Qty: 20 TAB | Refills: 0 | Status: SHIPPED | OUTPATIENT
Start: 2017-09-22 | End: 2017-10-02

## 2017-09-22 RX ORDER — FLUOCINONIDE 0.5 MG/G
CREAM TOPICAL
Qty: 60 G | Refills: 1 | Status: SHIPPED | OUTPATIENT
Start: 2017-09-22

## 2017-09-22 NOTE — PATIENT INSTRUCTIONS
Health Maintenance Due   Topic Date Due    DTaP/Tdap/Td  (1 - Tdap) 09/13/1943    Shingles Vaccine  07/13/1982    Glaucoma Screening   09/13/1987    Pneumococcal Vaccine (2 of 2 - PCV13) 10/12/2006

## 2017-09-22 NOTE — PROGRESS NOTES
1. Have you been to the ER, urgent care clinic since your last visit? Hospitalized since your last visit? No    2. Have you seen or consulted any other health care providers outside of the [address] [place] [address] since your last visit? Include any pap smears or colon screening.  No

## 2017-09-22 NOTE — MR AVS SNAPSHOT
Visit Information Date & Time Provider Department Dept. Phone Encounter #  
 9/22/2017  8:45 AM Crystal Gaviria MD [facility] INTERNAL MEDICINE OF Nato Hoksins [phone] [id number] Follow-up Instructions Return in about 4 days (around 9/26/2017). Your Appointments 9/26/2017 11:15 AM  
Follow Up with Crystal Gaviria MD  
[address]) Appt Note: 5DAY  
 [address] [facility], [address] [facility] [address]  
  
   
 [address] [facility], [address] [facility] [address]  
  
    
 1/16/2018 10:00 AM  
Follow Up with Crystal Gaviria MD  
[address]) Appt Note: 4mo  
 [address] [facility], [address] [facility] [address]  
[phone] Upcoming Health Maintenance Date Due DTaP/Tdap/Td series (1 - Tdap) 9/13/1943 ZOSTER VACCINE AGE 60> 7/13/1982 GLAUCOMA SCREENING Q2Y 9/13/1987 Pneumococcal 65+ Low/Medium Risk (2 of 2 - PCV13) 10/12/2006 MEDICARE YEARLY EXAM 1/24/2018 Allergies as of 9/22/2017  Review Complete On: 9/22/2017 By: Bindu Ingram LPN Severity Noted Reaction Type Reactions Avelox [Moxifloxacin]    Rash Clindamycin    Rash Penicillin V Potassium    Rash Current Immunizations  Reviewed on 5/10/2017 Name Date Influenza High Dose Vaccine PF 9/19/2017, 10/11/2016 Pneumococcal Polysaccharide (PPSV-23) 10/12/2005 Not reviewed this visit Vitals BP Pulse Temp Resp Height(growth percentile) 130/70 (BP 1 Location: Left arm, BP Patient Position: Sitting) 87 97.4 °F (36.3 °C) (Tympanic) 16 5' 4\" (1.626 m) Weight(growth percentile) SpO2 BMI Smoking Status 152 lb (68.9 kg) 98% 26.09 kg/m2 Former Smoker Vitals History BMI and BSA Data Body Mass Index Body Surface Area 26.09 kg/m 2 1.76 m 2 Preferred Pharmacy Pharmacy Name Phone [address] [facility], [address] [place] [address] [phone] Your Updated Medication List  
  
   
This list is accurate as of: 9/22/17  9:11 AM.  Always use your most recent med list. amLODIPine 5 mg tablet Commonly known as:  NORVASC  
take 1 tablet by mouth once daily  
  
 doxycycline 100 mg tablet Commonly known as:  ADOXA Take 1 Tab by mouth two (2) times a day for 10 days. fluocinoNIDE 0.05 % topical cream  
Commonly known as:  LIDEX Apply twice per day FLUZONE HIGH-DOSE 2015-16 (PF) Syrg injection Generic drug:  influenza vaccine 2015-16 (65yr+)(PF)  
  
 levothyroxine 25 mcg tablet Commonly known as:  SYNTHROID  
take 1 tablet by mouth every morning ON AN EMPTY STOMACH  
  
 megestrol 40 mg tablet Commonly known as:  MEGACE  
1 tablet daily * memantine ER 14 mg capsule Commonly known as:  NAMENDA XR  
1 tablet daily * memantine 10 mg tablet Commonly known as:  NAMENDA  
1 tablet twice per day * Notice: This list has 2 medication(s) that are the same as other medications prescribed for you. Read the directions carefully, and ask your doctor or other care provider to review them with you. Prescriptions Sent to Pharmacy Refills  
 doxycycline (ADOXA) 100 mg tablet 0 Sig: Take 1 Tab by mouth two (2) times a day for 10 days. Class: Normal  
 Pharmacy: [address] Ph #: [phone] Route: Oral  
 fluocinoNIDE (LIDEX) 0.05 % topical cream 1 Sig: Apply twice per day Class: Normal  
 Pharmacy: [address] Ph #: [phone] Follow-up Instructions Return in about 4 days (around 9/26/2017). Patient Instructions Health Maintenance Due Topic Date Due  
 DTaP/Tdap/Td  (1 - Tdap) 09/13/1943  Shingles Vaccine  07/13/1982  Glaucoma Screening   09/13/1987  Pneumococcal Vaccine (2 of 2 - PCV13) 10/12/2006 Introducing [facility]! [facility] introduces Daptiv patient portal. Now you can access parts of your medical record, email your doctor's office, and request medication refills online. 1. In your internet browser, go to [email or web address] [email or web address] [email or web address] 2. Click on the First Time User? Click Here link in the Sign In box. You will see the New Member Sign Up page. 3. Enter your Daptiv Access Code exactly as it appears below. You will not need to use this code after youve completed the sign-up process. If you do not sign up before the expiration date, you must request a new code. · Daptiv Access Code: [email or web address] Expires: 12/18/2017 11:06 AM 
 
4. Enter the last four digits of your Social Security Number (xxxx) and Date of Birth (mm/dd/yyyy) as indicated and click Submit. You will be taken to the next sign-up page. 5. Create a Daptiv ID. This will be your Daptiv login ID and cannot be changed, so think of one that is secure and easy to remember. 6. Create a Daptiv password. You can change your password at any time. 7. Enter your Password Reset Question and Answer. This can be used at a later time if you forget your password. 8. Enter your e-mail address. You will receive e-mail notification when new information is available in [address] 9. Click Sign Up. You can now view and download portions of your medical record. 10. Click the Download Summary menu link to download a portable copy of your medical information. If you have questions, please visit the Frequently Asked Questions section of the Daptiv website. Remember, Daptiv is NOT to be used for urgent needs. For medical emergencies, dial 911. Now available from your iPhone and Android! Please provide this summary of care documentation to your next provider. Your primary care clinician is listed as John Wilson.  If you have any questions after today's visit, please call [phone].

## 2017-09-22 NOTE — PROGRESS NOTES
The patient presents to the office today with the chief complaint of headache    HPI    The patient complains of a headache. Present for several weeks. No clear history of trauma but the patient remains confused. The confusion is worsentng. The patient remains on medications for hypertension. He is tolerating the medications. Review of Systems   Respiratory: Negative for shortness of breath. Cardiovascular: Negative for chest pain and leg swelling. Neurological: Positive for headaches. Allergies   Allergen Reactions    Avelox [Moxifloxacin] Rash    Clindamycin Rash    Penicillin V Potassium Rash       Current Outpatient Prescriptions   Medication Sig Dispense Refill    fluocinoNIDE (LIDEX) 0.05 % topical cream Apply twice per day 60 g 1    megestrol (MEGACE) 40 mg tablet 1 tablet daily 30 Tab 3    levothyroxine (SYNTHROID) 25 mcg tablet take 1 tablet by mouth every morning ON AN EMPTY STOMACH 30 Tab 2    amLODIPine (NORVASC) 5 mg tablet take 1 tablet by mouth once daily 30 Tab 2    memantine (NAMENDA) 10 mg tablet 1 tablet twice per day 60 Tab 3    memantine ER (NAMENDA XR) 14 mg capsule 1 tablet daily 30 Cap 2    FLUZONE HIGH-DOSE 2015-16, PF, syrg injection   0    doxycycline (ADOXA) 100 mg tablet Take 1 Tab by mouth two (2) times a day for 10 days. 20 Tab 0       Past Medical History:   Diagnosis Date    Asbestosis ([address]     Benign essential hypertension     Hyperplasia of prostate with urinary obstruction     Memory loss     Pure hypercholesterolemia        Past Surgical History:   Procedure Laterality Date    HX APPENDECTOMY      HX CHOLECYSTECTOMY  7/8/1997    HX OTHER SURGICAL  10/1999    repair of abdominal aortic aneursym       Social History     Social History    Marital status: UNKNOWN     Spouse name: N/A    Number of children: N/A    Years of education: N/A     Occupational History    Not on file.      Social History Main Topics    Smoking status: Former Smoker    Smokeless tobacco: Never Used    Alcohol use No    Drug use: No    Sexual activity: No     Other Topics Concern    Not on file     Social History Narrative       Patient does not have an advanced directive on file    Visit Vitals    /70 (BP 1 Location: Left arm, BP Patient Position: Sitting)    Pulse 81    Temp 97.9 °F (36.6 °C) (Tympanic)    Resp 16    Ht 5' 4\" (1.626 m)    Wt 152 lb (68.9 kg)    SpO2 100%    BMI 26.09 kg/m2       Physical Exam   Neck: Carotid bruit is not present. Cardiovascular: Normal rate and regular rhythm. Exam reveals no gallop. No murmur heard. Pulmonary/Chest: He has no wheezes. He has no rales. Abdominal: Soft. He exhibits no distension. There is no tenderness. Musculoskeletal: He exhibits no edema. Neurological:   The patient is confused       BMI:  OK    No visits with results within 3 Month(s) from this visit. Latest known visit with results is:    Hospital Outpatient Visit on 01/23/2017   Component Date Value Ref Range Status    WBC 01/23/2017 4.5* 4.6 - 13.2 K/uL Final    RBC 01/23/2017 3.88* 4.70 - 5.50 M/uL Final    HGB 01/23/2017 13.1  13.0 - 16.0 g/dL Final    HCT 01/23/2017 39.9  36.0 - 48.0 % Final    MCV 01/23/2017 102.8* 74.0 - 97.0 FL Final    MCH 01/23/2017 33.8  24.0 - 34.0 PG Final    MCHC 01/23/2017 32.8  31.0 - 37.0 g/dL Final    RDW 01/23/2017 13.3  11.6 - 14.5 % Final    PLATELET 41/87/7923 069* 135 - 420 K/uL Final    MPV 01/23/2017 10.9  9.2 - 11.8 FL Final    NEUTROPHILS 01/23/2017 68  40 - 73 % Final    LYMPHOCYTES 01/23/2017 20* 21 - 52 % Final    MONOCYTES 01/23/2017 8  3 - 10 % Final    EOSINOPHILS 01/23/2017 4  0 - 5 % Final    BASOPHILS 01/23/2017 0  0 - 2 % Final    ABS. NEUTROPHILS 01/23/2017 3.0  1.8 - 8.0 K/UL Final    ABS. LYMPHOCYTES 01/23/2017 0.9  0.9 - 3.6 K/UL Final    ABS. MONOCYTES 01/23/2017 0.4  0.05 - 1.2 K/UL Final    ABS. EOSINOPHILS 01/23/2017 0.2  0.0 - 0.4 K/UL Final    ABS. BASOPHILS 01/23/2017 0.0  0.0 - 0.06 K/UL Final    DF 01/23/2017 AUTOMATED    Final    Sodium 01/23/2017 136  136 - 145 mmol/L Final    Potassium 01/23/2017 3.9  3.5 - 5.5 mmol/L Final    Chloride 01/23/2017 98* 100 - 108 mmol/L Final    CO2 01/23/2017 30  21 - 32 mmol/L Final    Anion gap 01/23/2017 8  3.0 - 18 mmol/L Final    Glucose 01/23/2017 77  74 - 99 mg/dL Final    BUN 01/23/2017 12  7.0 - 18 MG/DL Final    Creatinine 01/23/2017 0.97  0.6 - 1.3 MG/DL Final    BUN/Creatinine ratio 01/23/2017 12  12 - 20   Final    GFR est AA 01/23/2017 >60  >60 ml/min/1.73m2 Final    GFR est non-AA 01/23/2017 >60  >60 ml/min/1.73m2 Final    Comment: (NOTE)  Estimated GFR is calculated using the Modification of Diet in Renal   Disease (MDRD) Study equation, reported for both  Americans   (GFRAA) and non- Americans (GFRNA), and normalized to 1.73m2   body surface area. The physician must decide which value applies to   the patient. The MDRD study equation should only be used in   individuals age 25 or older. It has not been validated for the   following: pregnant women, patients with serious comorbid conditions,   or on certain medications, or persons with extremes of body size,   muscle mass, or nutritional status.  Calcium 01/23/2017 8.6  8.5 - 10.1 MG/DL Final    Bilirubin, total 01/23/2017 0.6  0.2 - 1.0 MG/DL Final    ALT (SGPT) 01/23/2017 26  16 - 61 U/L Final    AST (SGOT) 01/23/2017 13* 15 - 37 U/L Final    Alk. phosphatase 01/23/2017 117  45 - 117 U/L Final    Protein, total 01/23/2017 6.6  6.4 - 8.2 g/dL Final    Albumin 01/23/2017 3.8  3.4 - 5.0 g/dL Final    Globulin 01/23/2017 2.8  2.0 - 4.0 g/dL Final    A-G Ratio 01/23/2017 1.4  0.8 - 1.7   Final    TSH 01/23/2017 3.73  0.36 - 3.74 uIU/mL Final       .No results found for any visits on 09/19/17. Assessment / Plan      ICD-10-CM ICD-9-CM    1.  Headache, unspecified headache type R51 784.0 INFLUENZA VIRUS VACCINE, HIGH DOSE SEASONAL, PRESERVATIVE FREE      ADMIN INFLUENZA VIRUS VAC      megestrol (MEGACE) 40 mg tablet      C REACTIVE PROTEIN, QT      SED RATE (ESR)      CBC WITH AUTOMATED DIFF      METABOLIC PANEL, COMPREHENSIVE   2. Encounter for immunization Z23 V03.89 INFLUENZA VIRUS VACCINE, HIGH DOSE SEASONAL, PRESERVATIVE FREE      ADMIN INFLUENZA VIRUS VAC      megestrol (MEGACE) 40 mg tablet      C REACTIVE PROTEIN, QT      SED RATE (ESR)      CBC WITH AUTOMATED DIFF      METABOLIC PANEL, COMPREHENSIVE   3. Memory loss R41.3 780.93 INFLUENZA VIRUS VACCINE, HIGH DOSE SEASONAL, PRESERVATIVE FREE      ADMIN INFLUENZA VIRUS VAC      megestrol (MEGACE) 40 mg tablet      C REACTIVE PROTEIN, QT      SED RATE (ESR)      CBC WITH AUTOMATED DIFF      METABOLIC PANEL, COMPREHENSIVE   4. Benign essential hypertension I10 401.1 INFLUENZA VIRUS VACCINE, HIGH DOSE SEASONAL, PRESERVATIVE FREE      ADMIN INFLUENZA VIRUS VAC      megestrol (MEGACE) 40 mg tablet      C REACTIVE PROTEIN, QT      SED RATE (ESR)      CBC WITH AUTOMATED DIFF      METABOLIC PANEL, COMPREHENSIVE     Labs  Flu shot given  he was advised to continue his maintenance medications  Further plans will be based on results of the lab tests    Follow-up Disposition:  Return in about 4 months (around 1/19/2018). I asked Pebbles Chaudhari if he has any questions and I answered the questions.   Pebbles Chaudhari states that he understands the treatment plan and agrees with the treatment plan

## 2017-09-24 NOTE — PROGRESS NOTES
The patient presents to the office today with the chief complaint of rash    HPI    The patient has developed a rash over the past 3 days. No clear inciting factors. The rash is mildly pruritic      Review of Systems   Respiratory: Negative for shortness of breath. Cardiovascular: Negative for chest pain and leg swelling. Skin: Positive for rash. Allergies   Allergen Reactions    Avelox [Moxifloxacin] Rash    Clindamycin Rash    Penicillin V Potassium Rash       Current Outpatient Prescriptions   Medication Sig Dispense Refill    doxycycline (ADOXA) 100 mg tablet Take 1 Tab by mouth two (2) times a day for 10 days. 20 Tab 0    fluocinoNIDE (LIDEX) 0.05 % topical cream Apply twice per day 60 g 1    megestrol (MEGACE) 40 mg tablet 1 tablet daily 30 Tab 3    levothyroxine (SYNTHROID) 25 mcg tablet take 1 tablet by mouth every morning ON AN EMPTY STOMACH 30 Tab 2    amLODIPine (NORVASC) 5 mg tablet take 1 tablet by mouth once daily 30 Tab 2    memantine (NAMENDA) 10 mg tablet 1 tablet twice per day 60 Tab 3    memantine ER (NAMENDA XR) 14 mg capsule 1 tablet daily 30 Cap 2    FLUZONE HIGH-DOSE 2015-16, PF, syrg injection   0       Past Medical History:   Diagnosis Date    Asbestosis ([address]     Benign essential hypertension     Hyperplasia of prostate with urinary obstruction     Memory loss     Pure hypercholesterolemia        Past Surgical History:   Procedure Laterality Date    HX APPENDECTOMY      HX CHOLECYSTECTOMY  7/8/1997    HX OTHER SURGICAL  10/1999    repair of abdominal aortic aneursym       Social History     Social History    Marital status: UNKNOWN     Spouse name: N/A    Number of children: N/A    Years of education: N/A     Occupational History    Not on file.      Social History Main Topics    Smoking status: Former Smoker    Smokeless tobacco: Never Used    Alcohol use No    Drug use: No    Sexual activity: No     Other Topics Concern    Not on file     Social History Narrative       Patient does not have an advanced directive on file    Visit Vitals    /70 (BP 1 Location: Left arm, BP Patient Position: Sitting)    Pulse 87    Temp 97.4 °F (36.3 °C) (Tympanic)    Resp 16    Ht 5' 4\" (1.626 m)    Wt 152 lb (68.9 kg)    SpO2 98%    BMI 26.09 kg/m2       Physical Exam   Cardiovascular: Exam reveals no gallop. No murmur heard. Pulmonary/Chest: He has no wheezes. He has no rales. Skin:   A deep red rash with thickened skin from proximal feet to 5 cm below the knees bilaterally. BMI:  OK    No visits with results within 3 Month(s) from this visit. Latest known visit with results is:    Hospital Outpatient Visit on 01/23/2017   Component Date Value Ref Range Status    WBC 01/23/2017 4.5* 4.6 - 13.2 K/uL Final    RBC 01/23/2017 3.88* 4.70 - 5.50 M/uL Final    HGB 01/23/2017 13.1  13.0 - 16.0 g/dL Final    HCT 01/23/2017 39.9  36.0 - 48.0 % Final    MCV 01/23/2017 102.8* 74.0 - 97.0 FL Final    MCH 01/23/2017 33.8  24.0 - 34.0 PG Final    MCHC 01/23/2017 32.8  31.0 - 37.0 g/dL Final    RDW 01/23/2017 13.3  11.6 - 14.5 % Final    PLATELET 12/69/2452 458* 135 - 420 K/uL Final    MPV 01/23/2017 10.9  9.2 - 11.8 FL Final    NEUTROPHILS 01/23/2017 68  40 - 73 % Final    LYMPHOCYTES 01/23/2017 20* 21 - 52 % Final    MONOCYTES 01/23/2017 8  3 - 10 % Final    EOSINOPHILS 01/23/2017 4  0 - 5 % Final    BASOPHILS 01/23/2017 0  0 - 2 % Final    ABS. NEUTROPHILS 01/23/2017 3.0  1.8 - 8.0 K/UL Final    ABS. LYMPHOCYTES 01/23/2017 0.9  0.9 - 3.6 K/UL Final    ABS. MONOCYTES 01/23/2017 0.4  0.05 - 1.2 K/UL Final    ABS. EOSINOPHILS 01/23/2017 0.2  0.0 - 0.4 K/UL Final    ABS.  BASOPHILS 01/23/2017 0.0  0.0 - 0.06 K/UL Final    DF 01/23/2017 AUTOMATED    Final    Sodium 01/23/2017 136  136 - 145 mmol/L Final    Potassium 01/23/2017 3.9  3.5 - 5.5 mmol/L Final    Chloride 01/23/2017 98* 100 - 108 mmol/L Final    CO2 01/23/2017 30  21 - 32 mmol/L Final    Anion gap 01/23/2017 8  3.0 - 18 mmol/L Final    Glucose 01/23/2017 77  74 - 99 mg/dL Final    BUN 01/23/2017 12  7.0 - 18 MG/DL Final    Creatinine 01/23/2017 0.97  0.6 - 1.3 MG/DL Final    BUN/Creatinine ratio 01/23/2017 12  12 - 20   Final    GFR est AA 01/23/2017 >60  >60 ml/min/1.73m2 Final    GFR est non-AA 01/23/2017 >60  >60 ml/min/1.73m2 Final    Comment: (NOTE)  Estimated GFR is calculated using the Modification of Diet in Renal   Disease (MDRD) Study equation, reported for both  Americans   (GFRAA) and non- Americans (GFRNA), and normalized to 1.73m2   body surface area. The physician must decide which value applies to   the patient. The MDRD study equation should only be used in   individuals age 25 or older. It has not been validated for the   following: pregnant women, patients with serious comorbid conditions,   or on certain medications, or persons with extremes of body size,   muscle mass, or nutritional status.  Calcium 01/23/2017 8.6  8.5 - 10.1 MG/DL Final    Bilirubin, total 01/23/2017 0.6  0.2 - 1.0 MG/DL Final    ALT (SGPT) 01/23/2017 26  16 - 61 U/L Final    AST (SGOT) 01/23/2017 13* 15 - 37 U/L Final    Alk. phosphatase 01/23/2017 117  45 - 117 U/L Final    Protein, total 01/23/2017 6.6  6.4 - 8.2 g/dL Final    Albumin 01/23/2017 3.8  3.4 - 5.0 g/dL Final    Globulin 01/23/2017 2.8  2.0 - 4.0 g/dL Final    A-G Ratio 01/23/2017 1.4  0.8 - 1.7   Final    TSH 01/23/2017 3.73  0.36 - 3.74 uIU/mL Final       .No results found for any visits on 09/22/17. Assessment / Plan      ICD-10-CM ICD-9-CM    1. Rash R21 782.1      Keflex  Lidex  he was advised to continue his maintenance medications  he is to call if symptoms persist over five days. To seek immediate attention if the situation worsens      Follow-up Disposition:  Return in about 4 days (around 9/26/2017). I asked Awilda Roberts if he has any questions and I answered the questions.   Marychuy Peralta Hussain Batista states that he understands the treatment plan and agrees with the treatment plan

## 2017-09-26 ENCOUNTER — OFFICE VISIT (OUTPATIENT)
Dept: INTERNAL MEDICINE CLINIC | Age: 82
End: 2017-09-26

## 2017-09-26 VITALS
HEART RATE: 88 BPM | DIASTOLIC BLOOD PRESSURE: 70 MMHG | RESPIRATION RATE: 16 BRPM | SYSTOLIC BLOOD PRESSURE: 136 MMHG | HEIGHT: 64 IN | TEMPERATURE: 97.7 F | WEIGHT: 152 LBS | BODY MASS INDEX: 25.95 KG/M2 | OXYGEN SATURATION: 98 %

## 2017-09-26 DIAGNOSIS — L30.9 DERMATITIS: ICD-10-CM

## 2017-09-26 DIAGNOSIS — R41.3 MEMORY LOSS: Primary | ICD-10-CM

## 2017-09-26 RX ORDER — PREDNISONE 10 MG/1
TABLET ORAL
Qty: 14 TAB | Refills: 0 | Status: SHIPPED | OUTPATIENT
Start: 2017-09-26 | End: 2019-03-05 | Stop reason: ALTCHOICE

## 2017-09-26 NOTE — PATIENT INSTRUCTIONS
Health Maintenance Due   Topic    DTaP/Tdap/Td series (1 - Tdap)    ZOSTER VACCINE AGE 60>     GLAUCOMA SCREENING Q2Y     Pneumococcal 65+ Low/Medium Risk (2 of 2 - PCV13)

## 2017-09-26 NOTE — PROGRESS NOTES
1. Have you been to the ER, urgent care clinic since your last visit? Hospitalized since your last visit? No    2. Have you seen or consulted any other health care providers outside of the [address] [place] [address] since your last visit? Include any pap smears or colon screening.  No

## 2017-09-27 PROBLEM — L30.9 DERMATITIS: Status: ACTIVE | Noted: 2017-09-27

## 2017-09-27 NOTE — PROGRESS NOTES
The patient presents to the office today with the chief complaint of Dermatitis    HPI    The patient is on Keflex and topical steroids. His legs have greatly improved. The patient offers no complaints of itching or pain at the rash. The patient remains on Namenda. His memory remains poor. Review of Systems   Respiratory: Negative for shortness of breath. Cardiovascular: Negative for chest pain and leg swelling. Skin: Positive for rash. Allergies   Allergen Reactions    Avelox [Moxifloxacin] Rash    Clindamycin Rash    Penicillin V Potassium Rash       Current Outpatient Prescriptions   Medication Sig Dispense Refill    predniSONE (DELTASONE) 10 mg tablet 3 tabs x 2 days, 2 tabs x 3 days, 1 tab x 4 days, 1/2 tab x 5 days 14 Tab 0    doxycycline (ADOXA) 100 mg tablet Take 1 Tab by mouth two (2) times a day for 10 days.  20 Tab 0    fluocinoNIDE (LIDEX) 0.05 % topical cream Apply twice per day 60 g 1    megestrol (MEGACE) 40 mg tablet 1 tablet daily 30 Tab 3    levothyroxine (SYNTHROID) 25 mcg tablet take 1 tablet by mouth every morning ON AN EMPTY STOMACH 30 Tab 2    amLODIPine (NORVASC) 5 mg tablet take 1 tablet by mouth once daily 30 Tab 2    memantine (NAMENDA) 10 mg tablet 1 tablet twice per day 60 Tab 3    memantine ER (NAMENDA XR) 14 mg capsule 1 tablet daily 30 Cap 2    FLUZONE HIGH-DOSE 2015-16, PF, syrg injection   0       Past Medical History:   Diagnosis Date    [address]osis ([facility] [address]     Benign essential hypertension     Hyperplasia of prostate with urinary obstruction     Memory loss     Pure hypercholesterolemia        Past Surgical History:   Procedure Laterality Date    HX APPENDECTOMY      HX CHOLECYSTECTOMY  7/8/1997    HX OTHER SURGICAL  10/1999    repair of abdominal aortic aneursym       Social History     Social History    Marital status: UNKNOWN     Spouse name: N/A    Number of children: N/A    Years of education: N/A     Occupational History    Not on file.     Social History Main Topics    Smoking status: Former Smoker    Smokeless tobacco: Never Used    Alcohol use No    Drug use: No    Sexual activity: No     Other Topics Concern    Not on file     Social History Narrative       Patient does not have an advanced directive on file    Visit Vitals    /70 (BP 1 Location: Left arm, BP Patient Position: Sitting)    Pulse 88    Temp 97.7 °F (36.5 °C) (Tympanic)    Resp 16    Ht 5' 4\" (1.626 m)    Wt 152 lb (68.9 kg)    SpO2 98%    BMI 26.09 kg/m2       Physical Exam   Neurological:   The patient recognizes the examiner but he remains onfused   Skin:   The previous thickened skin over both calves has greatly improved. The area of inflammation is the same but the erythema is less intense       BMI:  OK    No visits with results within 3 Month(s) from this visit. Latest known visit with results is:    Hospital Outpatient Visit on 01/23/2017   Component Date Value Ref Range Status    WBC 01/23/2017 4.5* 4.6 - 13.2 K/uL Final    RBC 01/23/2017 3.88* 4.70 - 5.50 M/uL Final    HGB 01/23/2017 13.1  13.0 - 16.0 g/dL Final    HCT 01/23/2017 39.9  36.0 - 48.0 % Final    MCV 01/23/2017 102.8* 74.0 - 97.0 FL Final    MCH 01/23/2017 33.8  24.0 - 34.0 PG Final    MCHC 01/23/2017 32.8  31.0 - 37.0 g/dL Final    RDW 01/23/2017 13.3  11.6 - 14.5 % Final    PLATELET 32/39/9341 036* 135 - 420 K/uL Final    MPV 01/23/2017 10.9  9.2 - 11.8 FL Final    NEUTROPHILS 01/23/2017 68  40 - 73 % Final    LYMPHOCYTES 01/23/2017 20* 21 - 52 % Final    MONOCYTES 01/23/2017 8  3 - 10 % Final    EOSINOPHILS 01/23/2017 4  0 - 5 % Final    BASOPHILS 01/23/2017 0  0 - 2 % Final    ABS. NEUTROPHILS 01/23/2017 3.0  1.8 - 8.0 K/UL Final    ABS. LYMPHOCYTES 01/23/2017 0.9  0.9 - 3.6 K/UL Final    ABS. MONOCYTES 01/23/2017 0.4  0.05 - 1.2 K/UL Final    ABS. EOSINOPHILS 01/23/2017 0.2  0.0 - 0.4 K/UL Final    ABS.  BASOPHILS 01/23/2017 0.0  0.0 - 0.06 K/UL Final    DF 01/23/2017 AUTOMATED    Final    Sodium 01/23/2017 136  136 - 145 mmol/L Final    Potassium 01/23/2017 3.9  3.5 - 5.5 mmol/L Final    Chloride 01/23/2017 98* 100 - 108 mmol/L Final    CO2 01/23/2017 30  21 - 32 mmol/L Final    Anion gap 01/23/2017 8  3.0 - 18 mmol/L Final    Glucose 01/23/2017 77  74 - 99 mg/dL Final    BUN 01/23/2017 12  7.0 - 18 MG/DL Final    Creatinine 01/23/2017 0.97  0.6 - 1.3 MG/DL Final    BUN/Creatinine ratio 01/23/2017 12  12 - 20   Final    GFR est AA 01/23/2017 >60  >60 ml/min/1.73m2 Final    GFR est non-AA 01/23/2017 >60  >60 ml/min/1.73m2 Final    Comment: (NOTE)  Estimated GFR is calculated using the Modification of Diet in Renal   Disease (MDRD) Study equation, reported for both  Americans   (GFRAA) and non- Americans (GFRNA), and normalized to 1.73m2   body surface area. The physician must decide which value applies to   the patient. The MDRD study equation should only be used in   individuals age 25 or older. It has not been validated for the   following: pregnant women, patients with serious comorbid conditions,   or on certain medications, or persons with extremes of body size,   muscle mass, or nutritional status.  Calcium 01/23/2017 8.6  8.5 - 10.1 MG/DL Final    Bilirubin, total 01/23/2017 0.6  0.2 - 1.0 MG/DL Final    ALT (SGPT) 01/23/2017 26  16 - 61 U/L Final    AST (SGOT) 01/23/2017 13* 15 - 37 U/L Final    Alk. phosphatase 01/23/2017 117  45 - 117 U/L Final    Protein, total 01/23/2017 6.6  6.4 - 8.2 g/dL Final    Albumin 01/23/2017 3.8  3.4 - 5.0 g/dL Final    Globulin 01/23/2017 2.8  2.0 - 4.0 g/dL Final    A-G Ratio 01/23/2017 1.4  0.8 - 1.7   Final    TSH 01/23/2017 3.73  0.36 - 3.74 uIU/mL Final       .No results found for any visits on 09/26/17. Assessment / Plan      ICD-10-CM ICD-9-CM    1. Memory loss R41.3 780.93    2.  Dermatitis L30.9 692.9      Finish up the antibiotic  Change the topical steroid to oral Prednisone  he was advised to continue his maintenance medications    Follow-up Disposition:  Return in about 3 weeks (around 10/17/2017). I asked Alla Velasco if he has any questions and I answered the questions.   Alla Velasco states that he understands the treatment plan and agrees with the treatment plan

## 2017-10-03 ENCOUNTER — HOSPITAL ENCOUNTER (OUTPATIENT)
Dept: LAB | Age: 82
Discharge: HOME | End: 2017-10-03
Payer: MEDICARE

## 2017-10-03 DIAGNOSIS — I10 BENIGN ESSENTIAL HYPERTENSION: ICD-10-CM

## 2017-10-03 DIAGNOSIS — R41.3 MEMORY LOSS: ICD-10-CM

## 2017-10-03 DIAGNOSIS — R51.9 HEADACHE, UNSPECIFIED HEADACHE TYPE: ICD-10-CM

## 2017-10-03 DIAGNOSIS — Z23 ENCOUNTER FOR IMMUNIZATION: ICD-10-CM

## 2017-10-03 LAB
ALBUMIN SERPL-MCNC: 3.8 G/DL (ref 3.4–5)
ALBUMIN/GLOB SERPL: 1.2 {RATIO} (ref 0.8–1.7)
ALP SERPL-CCNC: 115 U/L (ref 45–117)
ALT SERPL-CCNC: 57 U/L (ref 16–61)
ANION GAP SERPL CALC-SCNC: 7 MMOL/L (ref 3–18)
AST SERPL-CCNC: 17 U/L (ref 15–37)
BASOPHILS # BLD: 0 K/UL (ref 0–0.1)
BASOPHILS NFR BLD: 0 % (ref 0–2)
BILIRUB SERPL-MCNC: 0.6 MG/DL (ref 0.2–1)
BUN SERPL-MCNC: 24 MG/DL (ref 7–18)
BUN/CREAT SERPL: 25 (ref 12–20)
CALCIUM SERPL-MCNC: 8.7 MG/DL (ref 8.5–10.1)
CHLORIDE SERPL-SCNC: 100 MMOL/L (ref 100–108)
CO2 SERPL-SCNC: 31 MMOL/L (ref 21–32)
CREAT SERPL-MCNC: 0.96 MG/DL (ref 0.6–1.3)
CRP SERPL-MCNC: <0.3 MG/DL (ref 0–0.3)
DIFFERENTIAL METHOD BLD: ABNORMAL
EOSINOPHIL # BLD: 0.1 K/UL (ref 0–0.4)
EOSINOPHIL NFR BLD: 1 % (ref 0–5)
ERYTHROCYTE [DISTWIDTH] IN BLOOD BY AUTOMATED COUNT: 12.8 % (ref 11.6–14.5)
ERYTHROCYTE [SEDIMENTATION RATE] IN BLOOD: 7 MM/HR (ref 0–20)
GLOBULIN SER CALC-MCNC: 3.1 G/DL (ref 2–4)
GLUCOSE SERPL-MCNC: 93 MG/DL (ref 74–99)
HCT VFR BLD AUTO: 41.2 % (ref 36–48)
HGB BLD-MCNC: 13.9 G/DL (ref 13–16)
LYMPHOCYTES # BLD: 1.2 K/UL (ref 0.9–3.6)
LYMPHOCYTES NFR BLD: 12 % (ref 21–52)
MCH RBC QN AUTO: 34.4 PG (ref 24–34)
MCHC RBC AUTO-ENTMCNC: 33.7 G/DL (ref 31–37)
MCV RBC AUTO: 102 FL (ref 74–97)
MONOCYTES # BLD: 0.9 K/UL (ref 0.05–1.2)
MONOCYTES NFR BLD: 9 % (ref 3–10)
NEUTS SEG # BLD: 7.9 K/UL (ref 1.8–8)
NEUTS SEG NFR BLD: 78 % (ref 40–73)
PLATELET # BLD AUTO: 151 K/UL (ref 135–420)
PMV BLD AUTO: 11.4 FL (ref 9.2–11.8)
POTASSIUM SERPL-SCNC: 3.4 MMOL/L (ref 3.5–5.5)
PROT SERPL-MCNC: 6.9 G/DL (ref 6.4–8.2)
RBC # BLD AUTO: 4.04 M/UL (ref 4.7–5.5)
SODIUM SERPL-SCNC: 138 MMOL/L (ref 136–145)
WBC # BLD AUTO: 10.1 K/UL (ref 4.6–13.2)

## 2017-10-03 PROCEDURE — 80053 COMPREHEN METABOLIC PANEL: CPT | Performed by: INTERNAL MEDICINE

## 2017-10-03 PROCEDURE — 85652 RBC SED RATE AUTOMATED: CPT | Performed by: INTERNAL MEDICINE

## 2017-10-03 PROCEDURE — 85025 COMPLETE CBC W/AUTO DIFF WBC: CPT | Performed by: INTERNAL MEDICINE

## 2017-10-03 PROCEDURE — 86140 C-REACTIVE PROTEIN: CPT | Performed by: INTERNAL MEDICINE

## 2017-10-03 PROCEDURE — 36415 COLL VENOUS BLD VENIPUNCTURE: CPT | Performed by: INTERNAL MEDICINE

## 2017-11-01 ENCOUNTER — CLINICAL SUPPORT (OUTPATIENT)
Dept: INTERNAL MEDICINE CLINIC | Age: 82
End: 2017-11-01

## 2017-11-01 DIAGNOSIS — Z23 ENCOUNTER FOR IMMUNIZATION: ICD-10-CM

## 2017-11-19 DIAGNOSIS — I10 BENIGN ESSENTIAL HYPERTENSION: ICD-10-CM

## 2017-11-19 DIAGNOSIS — E03.9 HYPOTHYROIDISM, UNSPECIFIED TYPE: ICD-10-CM

## 2017-11-22 RX ORDER — AMLODIPINE BESYLATE 5 MG/1
TABLET ORAL
Qty: 30 TAB | Refills: 2 | Status: SHIPPED | OUTPATIENT
Start: 2017-11-22 | End: 2018-02-12 | Stop reason: SDUPTHER

## 2017-11-22 RX ORDER — LEVOTHYROXINE SODIUM 25 UG/1
TABLET ORAL
Qty: 30 TAB | Refills: 2 | Status: SHIPPED | OUTPATIENT
Start: 2017-11-22 | End: 2018-02-12 | Stop reason: SDUPTHER

## 2017-11-22 RX ORDER — MEMANTINE HYDROCHLORIDE 10 MG/1
TABLET ORAL
Qty: 60 TAB | Refills: 3 | Status: SHIPPED | OUTPATIENT
Start: 2017-11-22 | End: 2018-09-10 | Stop reason: SDUPTHER

## 2017-12-04 NOTE — PROGRESS NOTES
Patient presented to office for flu shot. Allergies noted. Patient well and consenting to injection. Vaccine given intramuscular in right deltoid. Patient tolerated injection well and left office ambulatory. No

## 2018-02-12 DIAGNOSIS — I10 BENIGN ESSENTIAL HYPERTENSION: ICD-10-CM

## 2018-02-12 DIAGNOSIS — E03.9 HYPOTHYROIDISM, UNSPECIFIED TYPE: ICD-10-CM

## 2018-02-13 RX ORDER — LEVOTHYROXINE SODIUM 25 UG/1
TABLET ORAL
Qty: 30 TAB | Refills: 2 | Status: SHIPPED | OUTPATIENT
Start: 2018-02-13 | End: 2018-05-15 | Stop reason: SDUPTHER

## 2018-02-13 RX ORDER — AMLODIPINE BESYLATE 5 MG/1
TABLET ORAL
Qty: 30 TAB | Refills: 2 | Status: SHIPPED | OUTPATIENT
Start: 2018-02-13 | End: 2018-05-15 | Stop reason: SDUPTHER

## 2018-07-16 DIAGNOSIS — E03.9 HYPOTHYROIDISM, UNSPECIFIED TYPE: ICD-10-CM

## 2018-07-16 DIAGNOSIS — I10 BENIGN ESSENTIAL HYPERTENSION: ICD-10-CM

## 2018-07-16 RX ORDER — LEVOTHYROXINE SODIUM 25 UG/1
TABLET ORAL
Qty: 30 TAB | Refills: 0 | Status: SHIPPED | OUTPATIENT
Start: 2018-07-16 | End: 2018-08-14 | Stop reason: SDUPTHER

## 2018-07-16 RX ORDER — AMLODIPINE BESYLATE 5 MG/1
TABLET ORAL
Qty: 30 TAB | Refills: 0 | Status: SHIPPED | OUTPATIENT
Start: 2018-07-16 | End: 2018-08-14 | Stop reason: SDUPTHER

## 2018-08-14 DIAGNOSIS — I10 BENIGN ESSENTIAL HYPERTENSION: ICD-10-CM

## 2018-08-14 DIAGNOSIS — E03.9 HYPOTHYROIDISM, UNSPECIFIED TYPE: ICD-10-CM

## 2018-08-15 RX ORDER — LEVOTHYROXINE SODIUM 25 UG/1
TABLET ORAL
Qty: 30 TAB | Refills: 0 | Status: SHIPPED | OUTPATIENT
Start: 2018-08-15 | End: 2018-09-10 | Stop reason: SDUPTHER

## 2018-08-15 RX ORDER — AMLODIPINE BESYLATE 5 MG/1
TABLET ORAL
Qty: 30 TAB | Refills: 0 | Status: SHIPPED | OUTPATIENT
Start: 2018-08-15 | End: 2018-09-10 | Stop reason: SDUPTHER

## 2018-09-10 DIAGNOSIS — E03.9 HYPOTHYROIDISM, UNSPECIFIED TYPE: ICD-10-CM

## 2018-09-10 DIAGNOSIS — I10 BENIGN ESSENTIAL HYPERTENSION: ICD-10-CM

## 2018-09-10 RX ORDER — MEMANTINE HYDROCHLORIDE 10 MG/1
TABLET ORAL
Qty: 60 TAB | Refills: 3 | Status: SHIPPED | OUTPATIENT
Start: 2018-09-10 | End: 2019-01-12 | Stop reason: SDUPTHER

## 2018-09-11 RX ORDER — LEVOTHYROXINE SODIUM 25 UG/1
TABLET ORAL
Qty: 30 TAB | Refills: 0 | Status: SHIPPED | OUTPATIENT
Start: 2018-09-11 | End: 2018-10-26 | Stop reason: SDUPTHER

## 2018-09-11 RX ORDER — AMLODIPINE BESYLATE 5 MG/1
TABLET ORAL
Qty: 30 TAB | Refills: 0 | Status: SHIPPED | OUTPATIENT
Start: 2018-09-11 | End: 2018-10-26 | Stop reason: SDUPTHER

## 2018-10-22 ENCOUNTER — CLINICAL SUPPORT (OUTPATIENT)
Dept: INTERNAL MEDICINE CLINIC | Age: 83
End: 2018-10-22

## 2018-10-22 DIAGNOSIS — Z23 ENCOUNTER FOR IMMUNIZATION: ICD-10-CM

## 2018-10-22 NOTE — PROGRESS NOTES
Patient presented to office for influenza vaccine and prevnar 13 injection. Allergies noted. Patient well and consenting to injection. Injection given intramuscular in left deltoid. Patient tolerated injection well and left office ambulatory.

## 2018-10-26 DIAGNOSIS — E03.9 HYPOTHYROIDISM, UNSPECIFIED TYPE: ICD-10-CM

## 2018-10-26 DIAGNOSIS — I10 BENIGN ESSENTIAL HYPERTENSION: ICD-10-CM

## 2018-10-26 RX ORDER — LEVOTHYROXINE SODIUM 25 UG/1
TABLET ORAL
Qty: 30 TAB | Refills: 0 | Status: SHIPPED | OUTPATIENT
Start: 2018-10-26 | End: 2018-11-27 | Stop reason: SDUPTHER

## 2018-10-26 RX ORDER — AMLODIPINE BESYLATE 5 MG/1
TABLET ORAL
Qty: 30 TAB | Refills: 0 | Status: SHIPPED | OUTPATIENT
Start: 2018-10-26 | End: 2018-11-27 | Stop reason: SDUPTHER

## 2018-11-27 DIAGNOSIS — E03.9 HYPOTHYROIDISM, UNSPECIFIED TYPE: ICD-10-CM

## 2018-11-27 DIAGNOSIS — I10 BENIGN ESSENTIAL HYPERTENSION: ICD-10-CM

## 2018-11-27 RX ORDER — AMLODIPINE BESYLATE 5 MG/1
TABLET ORAL
Qty: 30 TAB | Refills: 0 | Status: SHIPPED | OUTPATIENT
Start: 2018-11-27 | End: 2019-01-17 | Stop reason: SDUPTHER

## 2018-11-27 RX ORDER — LEVOTHYROXINE SODIUM 25 UG/1
TABLET ORAL
Qty: 30 TAB | Refills: 0 | Status: SHIPPED | OUTPATIENT
Start: 2018-11-27 | End: 2018-12-18 | Stop reason: SDUPTHER

## 2018-11-27 NOTE — TELEPHONE ENCOUNTER
Requested Prescriptions     Pending Prescriptions Disp Refills    amLODIPine (NORVASC) 5 mg tablet [Pharmacy Med Name: AMLODIPINE BESYLATE 5 MG TAB] 30 Tab 0     Sig: take 1 tablet by mouth once daily    levothyroxine (SYNTHROID) 25 mcg tablet [Pharmacy Med Name: LEVOTHYROXINE 25 MCG TABLET] 30 Tab 0     Sig: take 1 tablet by mouth every morning ON AN EMPTY STOMACH

## 2018-12-04 ENCOUNTER — TELEPHONE (OUTPATIENT)
Dept: INTERNAL MEDICINE CLINIC | Age: 83
End: 2018-12-04

## 2018-12-04 NOTE — TELEPHONE ENCOUNTER
Patients daughter called with some concerns regarding her father. He called her this morning telling her that 9 people were in the house. He thought he was seeing people in his house. She went over there and stayed with him for a little bit. He also stated that he had just come home and got dropped off by the bus. She knows he has dementia but these are new things he is doing. Please advise at [phone].

## 2018-12-18 DIAGNOSIS — E03.9 HYPOTHYROIDISM, UNSPECIFIED TYPE: ICD-10-CM

## 2018-12-20 RX ORDER — LEVOTHYROXINE SODIUM 25 UG/1
TABLET ORAL
Qty: 30 TAB | Refills: 0 | Status: SHIPPED | OUTPATIENT
Start: 2018-12-20 | End: 2019-01-17 | Stop reason: SDUPTHER

## 2019-01-13 RX ORDER — MEMANTINE HYDROCHLORIDE 10 MG/1
TABLET ORAL
Qty: 60 TAB | Refills: 3 | Status: SHIPPED | OUTPATIENT
Start: 2019-01-13 | End: 2019-03-05 | Stop reason: ALTCHOICE

## 2019-01-17 DIAGNOSIS — I10 BENIGN ESSENTIAL HYPERTENSION: ICD-10-CM

## 2019-01-17 DIAGNOSIS — E03.9 HYPOTHYROIDISM, UNSPECIFIED TYPE: ICD-10-CM

## 2019-01-17 RX ORDER — AMLODIPINE BESYLATE 5 MG/1
TABLET ORAL
Qty: 90 TAB | Refills: 3 | Status: SHIPPED | OUTPATIENT
Start: 2019-01-17 | End: 2019-10-17 | Stop reason: ALTCHOICE

## 2019-01-17 RX ORDER — LEVOTHYROXINE SODIUM 25 UG/1
TABLET ORAL
Qty: 90 TAB | Refills: 3 | Status: SHIPPED | OUTPATIENT
Start: 2019-01-17 | End: 2019-10-15 | Stop reason: SDUPTHER

## 2019-01-17 RX ORDER — LEVOTHYROXINE SODIUM 25 UG/1
TABLET ORAL
Qty: 30 TAB | Refills: 0 | Status: SHIPPED | OUTPATIENT
Start: 2019-01-17 | End: 2020-06-25 | Stop reason: SDUPTHER

## 2019-01-21 RX ORDER — AMLODIPINE BESYLATE 5 MG/1
TABLET ORAL
Qty: 30 TAB | Refills: 0 | Status: SHIPPED | OUTPATIENT
Start: 2019-01-21 | End: 2019-10-17 | Stop reason: ALTCHOICE

## 2019-02-28 ENCOUNTER — TELEPHONE (OUTPATIENT)
Dept: INTERNAL MEDICINE CLINIC | Age: 84
End: 2019-02-28

## 2019-02-28 RX ORDER — AZITHROMYCIN 250 MG/1
TABLET, FILM COATED ORAL
Qty: 6 TAB | Refills: 0 | Status: SHIPPED | OUTPATIENT
Start: 2019-02-28 | End: 2019-03-05 | Stop reason: ALTCHOICE

## 2019-02-28 RX ORDER — AZITHROMYCIN 250 MG/1
TABLET, FILM COATED ORAL
Qty: 6 TAB | Refills: 0 | Status: CANCELLED | OUTPATIENT
Start: 2019-02-28 | End: 2019-03-05

## 2019-02-28 NOTE — TELEPHONE ENCOUNTER
Patient has had a cough and congestion for over a week and Robitussin isn't helping. Request a prescription for cough and maybe antibiotic. Please advise.

## 2019-03-03 ENCOUNTER — TELEPHONE (OUTPATIENT)
Dept: INTERNAL MEDICINE CLINIC | Age: 84
End: 2019-03-03

## 2019-03-03 NOTE — TELEPHONE ENCOUNTER
Patient on Z Brenton for sinus congestion. Patient developed a rash on his hands.   Advised the patient to discontinue Zithromax and follow symptoms

## 2019-03-04 ENCOUNTER — OFFICE VISIT (OUTPATIENT)
Dept: INTERNAL MEDICINE CLINIC | Age: 84
End: 2019-03-04

## 2019-03-04 VITALS
WEIGHT: 163 LBS | SYSTOLIC BLOOD PRESSURE: 120 MMHG | DIASTOLIC BLOOD PRESSURE: 70 MMHG | HEIGHT: 67 IN | OXYGEN SATURATION: 93 % | HEART RATE: 93 BPM | BODY MASS INDEX: 25.58 KG/M2 | TEMPERATURE: 97.9 F

## 2019-03-04 DIAGNOSIS — R41.3 MEMORY LOSS: ICD-10-CM

## 2019-03-04 DIAGNOSIS — J40 BRONCHITIS: Primary | ICD-10-CM

## 2019-03-04 RX ORDER — DOXYCYCLINE 50 MG/1
TABLET ORAL
Qty: 14 TAB | Refills: 0 | Status: SHIPPED | OUTPATIENT
Start: 2019-03-04 | End: 2020-06-25

## 2019-03-04 RX ORDER — PREDNISONE 10 MG/1
TABLET ORAL
Qty: 8 TAB | Refills: 0 | Status: SHIPPED | OUTPATIENT
Start: 2019-03-04 | End: 2019-10-15 | Stop reason: ALTCHOICE

## 2019-03-04 NOTE — PROGRESS NOTES
Kenyatta Rivera presents today for   Chief Complaint   Patient presents with    Well Male    Cough    Nasal Congestion       Quentinkaseyreese Nicole preferred language for health care discussion is english. Is someone accompanying this pt? Yes daughter    Is the patient using any DME equipment during OV? Yes quad cane    Depression Screening:  3 most recent PHQ Screens 9/19/2017   Little interest or pleasure in doing things Not at all   Feeling down, depressed, irritable, or hopeless Not at all   Total Score PHQ 2 0       Learning Assessment:  Learning Assessment 11/3/2015   PRIMARY LEARNER Patient   HIGHEST LEVEL OF EDUCATION - PRIMARY LEARNER  DID NOT GRADUATE HIGH SCHOOL   BARRIERS PRIMARY LEARNER NONE   CO-LEARNER CAREGIVER Yes   CO-LEARNER NAME yuridia Pavon HIGHEST LEVEL OF EDUCATION SOME COLLEGE   BARRIERS CO-LEARNER NONE   PRIMARY LANGUAGE ENGLISH   PRIMARY LANGUAGE CO-LEARNER ENGLISH    NEED No   LEARNER PREFERENCE PRIMARY DEMONSTRATION     READING     VIDEOS     PICTURES   LEARNER PREFERENCE CO-LEARNER DEMONSTRATION   ANSWERED BY son   RELATIONSHIP OTHER       Abuse Screening:  Abuse Screening Questionnaire 6/3/2016   Do you ever feel afraid of your partner? N   Are you in a relationship with someone who physically or mentally threatens you? N   Is it safe for you to go home? Y       Fall Risk  Fall Risk Assessment, last 12 mths 9/19/2017   Able to walk? Yes   Fall in past 12 months? No   Fall with injury? -   Number of falls in past 12 months -   Fall Risk Score -       Health Maintenance reviewed and discussed and ordered per Provider. Health Maintenance Due   Topic Date Due    DTaP/Tdap/Td series (1 - Tdap) 09/[birth year]/1943    Shingrix Vaccine Age 50> (1 of 2) 09/13/1972    MEDICARE YEARLY EXAM  03/14/2018   . Kenyatta Rivera is updated on all     Pt currently taking Antiplatelet therapy? no    Coordination of Care:  1.  Have you been to the ER, urgent care clinic since your last visit? yes [facility] hallucinating Hospitalized since your last visit? no    2. Have you seen or consulted any other health care providers outside of the [address] [place] [address] since your last visit? [facility] [place] Include any pap smears or colon screening.  no

## 2019-03-06 NOTE — PROGRESS NOTES
The patient presents to the office today with the chief complaint of chest congestion    HPI    The patient has chest congestion. He was been on Zithromax but he has developed a rash on his hands. The Zithromax has been discontinued. The hands have improved. The patient persists with chest congestion. The patient remains confused      Review of Systems   Respiratory: Positive for cough. Negative for shortness of breath. Cardiovascular: Negative for chest pain and leg swelling.        Allergies   Allergen Reactions    Avelox [Moxifloxacin] Rash    Clindamycin Rash    Penicillin V Potassium Rash    Zithromax [Azithromycin] Rash     Rash on both hands       Current Outpatient Medications   Medication Sig Dispense Refill    doxycycline (ADOXA) 50 mg tablet 1 tab twice per day 14 Tab 0    predniSONE (DELTASONE) 10 mg tablet 2 tabs daily x 2 days, 1 tab daily x 2 days, 1/2 tab daily x 4 days 8 Tab 0    amLODIPine (NORVASC) 5 mg tablet take 1 tablet by mouth once daily 30 Tab 0    levothyroxine (SYNTHROID) 25 mcg tablet take 1 tablet by mouth every morning ON AN EMPTY STOMACH 30 Tab 0    amLODIPine (NORVASC) 5 mg tablet take 1 tablet by mouth once daily REQUEST FOR 90 DAY RX 90 Tab 3    levothyroxine (SYNTHROID) 25 mcg tablet take 1 tablet by mouth every morning ON AN EMPTY STOMACH (REQUEST FOR 90 DAY RX) 90 Tab 3    fluocinoNIDE (LIDEX) 0.05 % topical cream Apply twice per day 60 g 1    megestrol (MEGACE) 40 mg tablet 1 tablet daily 30 Tab 3       Past Medical History:   Diagnosis Date    Asbestosis ([address]     Benign essential hypertension     Hyperplasia of prostate with urinary obstruction     Memory loss     Pure hypercholesterolemia        Past Surgical History:   Procedure Laterality Date    HX APPENDECTOMY      HX CHOLECYSTECTOMY  7/8/1997    HX OTHER SURGICAL  10/1999    repair of abdominal aortic aneursym       Social History     Socioeconomic History    Marital status: UNKNOWN Spouse name: Not on file    Number of children: Not on file    Years of education: Not on file    Highest education level: Not on file   Social Needs    Financial resource strain: Not on file    Food insecurity - worry: Not on file    Food insecurity - inability: Not on file    Transportation needs - medical: Not on file   [employer] needs - non-medical: Not on file   Occupational History    Not on file   Tobacco Use    Smoking status: Former Smoker    Smokeless tobacco: Never Used   Substance and Sexual Activity    Alcohol use: No    Drug use: No    Sexual activity: No   Other Topics Concern    Not on file   Social History Narrative    Not on file       Patient does not have an advanced directive on file    Visit Vitals  /70 (BP 1 Location: Left arm, BP Patient Position: Sitting)   Pulse 93   Temp 97.9 °F (36.6 °C) (Tympanic)   Ht 5' 7\" (1.702 m)   Wt 163 lb (73.9 kg)   SpO2 93%   BMI 25.53 kg/m²       Physical Exam   Cardiovascular: Normal rate and regular rhythm. Exam reveals no gallop. No murmur heard. Pulmonary/Chest: He has wheezes. He has rales. Abdominal: Soft. He exhibits no distension. There is no tenderness. Musculoskeletal: He exhibits no edema. BMI:  OK    No visits with results within 3 Month(s) from this visit.    Latest known visit with results is:   Hospital Outpatient Visit on 10/03/2017   Component Date Value Ref Range Status    C-Reactive protein 10/03/2017 <0.3  0 - 0.3 mg/dL Final    Sed rate, automated 10/03/2017 7  0 - 20 mm/hr Final    WBC 10/03/2017 10.1  4.6 - 13.2 K/uL Final    RBC 10/03/2017 4.04* 4.70 - 5.50 M/uL Final    HGB 10/03/2017 13.9  13.0 - 16.0 g/dL Final    HCT 10/03/2017 41.2  36.0 - 48.0 % Final    MCV 10/03/2017 102.0* 74.0 - 97.0 FL Final    MCH 10/03/2017 34.4* 24.0 - 34.0 PG Final    MCHC 10/03/2017 33.7  31.0 - 37.0 g/dL Final    RDW 10/03/2017 12.8  11.6 - 14.5 % Final    PLATELET 94/41/3053 994  135 - 420 K/uL Final  MPV 10/03/2017 11.4  9.2 - 11.8 FL Final    NEUTROPHILS 10/03/2017 78* 40 - 73 % Final    LYMPHOCYTES 10/03/2017 12* 21 - 52 % Final    MONOCYTES 10/03/2017 9  3 - 10 % Final    EOSINOPHILS 10/03/2017 1  0 - 5 % Final    BASOPHILS 10/03/2017 0  0 - 2 % Final    ABS. NEUTROPHILS 10/03/2017 7.9  1.8 - 8.0 K/UL Final    ABS. LYMPHOCYTES 10/03/2017 1.2  0.9 - 3.6 K/UL Final    ABS. MONOCYTES 10/03/2017 0.9  0.05 - 1.2 K/UL Final    ABS. EOSINOPHILS 10/03/2017 0.1  0.0 - 0.4 K/UL Final    ABS. BASOPHILS 10/03/2017 0.0  0.0 - 0.1 K/UL Final    DF 10/03/2017 AUTOMATED    Final    Sodium 10/03/2017 138  136 - 145 mmol/L Final    Potassium 10/03/2017 3.4* 3.5 - 5.5 mmol/L Final    Chloride 10/03/2017 100  100 - 108 mmol/L Final    CO2 10/03/2017 31  21 - 32 mmol/L Final    Anion gap 10/03/2017 7  3.0 - 18 mmol/L Final    Glucose 10/03/2017 93  74 - 99 mg/dL Final    BUN 10/03/2017 24* 7.0 - 18 MG/DL Final    Creatinine 10/03/2017 0.96  0.6 - 1.3 MG/DL Final    BUN/Creatinine ratio 10/03/2017 25* 12 - 20   Final    GFR est AA 10/03/2017 >60  >60 ml/min/1.73m2 Final    GFR est non-AA 10/03/2017 >60  >60 ml/min/1.73m2 Final    Comment: (NOTE)  Estimated GFR is calculated using the Modification of Diet in Renal   Disease (MDRD) Study equation, reported for both  Americans   (GFRAA) and non- Americans (GFRNA), and normalized to 1.73m2   body surface area. The physician must decide which value applies to   the patient. The MDRD study equation should only be used in   individuals age 25 or older. It has not been validated for the   following: pregnant women, patients with serious comorbid conditions,   or on certain medications, or persons with extremes of body size,   muscle mass, or nutritional status.       Calcium 10/03/2017 8.7  8.5 - 10.1 MG/DL Final    Bilirubin, total 10/03/2017 0.6  0.2 - 1.0 MG/DL Final    ALT (SGPT) 10/03/2017 57  16 - 61 U/L Final    AST (SGOT) 10/03/2017 17 15 - 37 U/L Final    Alk. phosphatase 10/03/2017 115  45 - 117 U/L Final    Protein, total 10/03/2017 6.9  6.4 - 8.2 g/dL Final    Albumin 10/03/2017 3.8  3.4 - 5.0 g/dL Final    Globulin 10/03/2017 3.1  2.0 - 4.0 g/dL Final    A-G Ratio 10/03/2017 1.2  0.8 - 1.7   Final       .No results found for any visits on 03/04/19. Assessment / Plan      ICD-10-CM ICD-9-CM    1. Bronchitis J40 490    2. Memory loss R41.3 780.93        Doxycycline  Prednisone  he was advised to continue his maintenance medications  he or his caregiver are to call if symptoms persist over five days      Follow-up Disposition:  Return in about 4 months (around 7/4/2019). I asked Elizabeth Buckley if he has any questions and I answered the questions.   Elizabeth Buckley states that he understands the treatment plan and agrees with the treatment plan

## 2019-09-23 ENCOUNTER — TELEPHONE (OUTPATIENT)
Dept: FAMILY MEDICINE CLINIC | Facility: CLINIC | Age: 84
End: 2019-09-23

## 2019-09-30 RX ORDER — DICLOFENAC SODIUM 10 MG/G
GEL TOPICAL
Qty: 100 G | Refills: 3 | Status: SHIPPED | OUTPATIENT
Start: 2019-09-30 | End: 2021-04-30 | Stop reason: SDUPTHER

## 2019-10-15 ENCOUNTER — OFFICE VISIT (OUTPATIENT)
Dept: FAMILY MEDICINE CLINIC | Facility: CLINIC | Age: 84
End: 2019-10-15

## 2019-10-15 VITALS
HEIGHT: 67 IN | BODY MASS INDEX: 25.53 KG/M2 | TEMPERATURE: 98.3 F | DIASTOLIC BLOOD PRESSURE: 68 MMHG | RESPIRATION RATE: 14 BRPM | OXYGEN SATURATION: 94 % | HEART RATE: 83 BPM | SYSTOLIC BLOOD PRESSURE: 120 MMHG

## 2019-10-15 DIAGNOSIS — S61.512A TEAR OF SKIN OF LEFT WRIST, INITIAL ENCOUNTER: ICD-10-CM

## 2019-10-15 DIAGNOSIS — M25.532 LEFT WRIST PAIN: ICD-10-CM

## 2019-10-15 DIAGNOSIS — M79.642 PAIN OF LEFT HAND: Primary | ICD-10-CM

## 2019-10-15 DIAGNOSIS — F03.91 DEMENTIA WITH BEHAVIORAL DISTURBANCE, UNSPECIFIED DEMENTIA TYPE: ICD-10-CM

## 2019-10-15 DIAGNOSIS — S51.012A SKIN TEAR OF LEFT ELBOW WITHOUT COMPLICATION, INITIAL ENCOUNTER: ICD-10-CM
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
## 2019-10-15 RX ORDER — MEMANTINE HYDROCHLORIDE 10 MG/1
TABLET ORAL
Refills: 0 | COMMUNITY
Start: 2019-09-21 | End: 2020-01-28

## 2019-10-15 RX ORDER — PREDNISONE 20 MG/1
60 TABLET ORAL
COMMUNITY
Start: 2019-10-12 | End: 2019-10-17

## 2019-10-15 RX ORDER — LIDOCAINE 50 MG/G
PATCH TOPICAL
COMMUNITY
Start: 2019-10-12 | End: 2019-11-13 | Stop reason: SDUPTHER

## 2019-10-15 NOTE — PROGRESS NOTES
Raul Points presents today for   Chief Complaint   Patient presents with   Araceli Chapman Fall     skin tears on left arm       Raul Points preferred language for health care discussion is english. Is someone accompanying this pt? Yes son, and caregiver    Is the patient using any DME equipment during [address]? no    Depression Screening:  3 most recent PHQ Screens 9/19/2017   Little interest or pleasure in doing things Not at all   Feeling down, depressed, irritable, or hopeless Not at all   Total Score PHQ 2 0       Learning Assessment:  Learning Assessment 11/3/2015   PRIMARY LEARNER Patient   HIGHEST LEVEL OF EDUCATION - PRIMARY LEARNER  DID NOT GRADUATE HIGH SCHOOL   BARRIERS PRIMARY LEARNER NONE   CO-LEARNER CAREGIVER Yes   CO-LEARNER NAME yuridia Pavon HIGHEST LEVEL OF EDUCATION SOME COLLEGE   BARRIERS CO-LEARNER NONE   PRIMARY LANGUAGE ENGLISH   PRIMARY LANGUAGE CO-LEARNER ENGLISH    NEED No   LEARNER PREFERENCE PRIMARY DEMONSTRATION     READING     VIDEOS     PICTURES   LEARNER PREFERENCE CO-LEARNER DEMONSTRATION   ANSWERED BY son   RELATIONSHIP OTHER       Abuse Screening:  Abuse Screening Questionnaire 6/3/2016   Do you ever feel afraid of your partner? N   Are you in a relationship with someone who physically or mentally threatens you? N   Is it safe for you to go home? Y       Fall Risk  Fall Risk Assessment, last 12 mths 10/15/2019   Able to walk? Yes   Fall in past 12 months? Yes   Fall with injury? Yes   Number of falls in past 12 months 1   Fall Risk Score 2       Health Maintenance reviewed and discussed and ordered per Provider. Health Maintenance Due   Topic Date Due    DTaP/Tdap/Td series (1 - Tdap) 09/13/1943    Shingrix Vaccine Age 50> (1 of 2) 09/13/1972    MEDICARE YEARLY EXAM  03/14/2018    Influenza Age 9 to Adult  08/01/2019    GLAUCOMA SCREENING Q2Y  10/03/2019   . Raul Points is updated on all     Pt currently taking Antiplatelet therapy? no    Coordination of Care:  1. Have you been to the ER, urgent care clinic since your last visit? Yes [facility]  Left hip pain on 10/12/19  Hospitalized since your last visit? no    2. Have you seen or consulted any other health care providers outside of the [address] [place] [address] since your last visit? no Include any pap smears or colon screening.  no

## 2019-10-17 NOTE — PROGRESS NOTES
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 The patient remains on Namenda for this. The patient at times had been agitated and prone to wander. On the Namenda the patient appeared to be more calm at home. Recently patient developed pain in the low back and left hip area. The patient was seen in the emergency room. No clear abnormalities were noted patient felt to possibly have inflammation in the SI area. Patient is prescribed prednisone. With this medication the patient. Become more confused and a bit more agitated. The prednisone was held in the patient's confusion improved to some extent. The patient fell this morning. The patient's son was with him at the time and even with his son nearby the patient was suddenly went down. \"  Patient sustained trauma to the left arm. Is not clear the patient had any other areas of trauma. The patient sustained skin tears her left wrist and left elbow. The patient complains of left wrist pain. When obtaining history from the patient himself the patient appeared to complain of extreme tenderness at multiple sites on different parts of the body. On his other sites no clear abnormalities were noted. ROS  Review of systems cannot be reliably obtained from the patient due to the dementia. Allergies   Allergen Reactions    Avelox [Moxifloxacin] Rash    Clindamycin Rash    Penicillin V Potassium Rash    Zithromax [Azithromycin] Rash     Rash on both hands       Current Outpatient Medications   Medication Sig Dispense Refill    lidocaine (LIDODERM) 5 % Apply 1 patch as directed for 12 hours every 24 hours (12 hours on, 12 hours off)      memantine (NAMENDA) 10 mg tablet   0    predniSONE (DELTASONE) 20 mg tablet Take 60 mg by mouth.       diclofenac (VOLTAREN) 1 % gel Apply 4 grams three times per day 100 g 3    doxycycline (ADOXA) 50 mg tablet 1 tab twice per day 14 Tab 0    levothyroxine (SYNTHROID) 25 mcg tablet take 1 tablet by mouth every morning ON AN EMPTY STOMACH 30 Tab 0    fluocinoNIDE (LIDEX) 0.05 % topical cream Apply twice per day 60 g 1    megestrol (MEGACE) 40 mg tablet 1 tablet daily 30 Tab 3       Past Medical History:   Diagnosis Date    Asbestosis ([address]     Benign essential hypertension     Hyperplasia of prostate with urinary obstruction     Memory loss     Pure hypercholesterolemia        Past Surgical History:   Procedure Laterality Date    HX APPENDECTOMY      HX CHOLECYSTECTOMY  7/8/1997    HX OTHER SURGICAL  10/1999    repair of abdominal aortic aneursym       Social History     Socioeconomic History    Marital status: SINGLE     Spouse name: Not on file    Number of children: Not on file    Years of education: Not on file    Highest education level: Not on file   Occupational History    Not on file   Social Needs    Financial resource strain: Not on file    Food insecurity:     Worry: Not on file     Inability: Not on file    Transportation needs:     Medical: Not on file     Non-medical: Not on file   Tobacco Use    Smoking status: Former Smoker    Smokeless tobacco: Never Used   Substance and Sexual Activity    Alcohol use: No    Drug use: No    Sexual activity: Never   Lifestyle    Physical activity:     Days per week: Not on file     Minutes per session: Not on file    Stress: Not on file   Relationships    Social connections:     Talks on phone: Not on file     Gets together: Not on file     Attends [employer] service: Not on file     Active member of club or organization: Not on file     Attends meetings of clubs or organizations: Not on file     Relationship status: Not on file    Intimate partner violence:     Fear of current or ex partner: Not on file     Emotionally abused: Not on file     Physically abused: Not on file     Forced sexual activity: Not on file   Other Topics Concern    Not on file   Social History Narrative    Not on file       Patient does not have an advanced directive on file    Visit Vitals  /68 (BP 1 Location: Right arm, BP Patient Position: Sitting)   Pulse 83   Temp 98.3 °F (36.8 °C) (Tympanic)   Resp 14   Ht 5' 7\" (1.702 m)   SpO2 94%   BMI 25.53 kg/m²       Physical Exam  Skin tears are noted on the left distal wrist and proximal left elbow  There is areas of tenderness in the left proximal hand on the thumb side  Lungs are clear to auscultation  Cardiac exam: S1-S2 normal, no gallops, no murmurs  No carotid bruits  The patient is confused but calm. He recognizes the examiner but repeats the same statements over and over. Left hip with a fair degree of movement with rotation. There is mild pain but only on extremes of rotation. The patient's gait is slow and unsteady. BMI: Okay    No visits with results within 3 Month(s) from this visit. Latest known visit with results is:   Hospital Outpatient Visit on 10/03/2017   Component Date Value Ref Range Status    C-Reactive protein 10/03/2017 <0.3  0 - 0.3 mg/dL Final    Sed rate, automated 10/03/2017 7  0 - 20 mm/hr Final    WBC 10/03/2017 10.1  4.6 - 13.2 K/uL Final    RBC 10/03/2017 4.04* 4.70 - 5.50 M/uL Final    HGB 10/03/2017 13.9  13.0 - 16.0 g/dL Final    HCT 10/03/2017 41.2  36.0 - 48.0 % Final    MCV 10/03/2017 102.0* 74.0 - 97.0 FL Final    MCH 10/03/2017 34.4* 24.0 - 34.0 PG Final    MCHC 10/03/2017 33.7  31.0 - 37.0 g/dL Final    RDW 10/03/2017 12.8  11.6 - 14.5 % Final    PLATELET 54/96/5030 310  135 - 420 K/uL Final    MPV 10/03/2017 11.4  9.2 - 11.8 FL Final    NEUTROPHILS 10/03/2017 78* 40 - 73 % Final    LYMPHOCYTES 10/03/2017 12* 21 - 52 % Final    MONOCYTES 10/03/2017 9  3 - 10 % Final    EOSINOPHILS 10/03/2017 1  0 - 5 % Final    BASOPHILS 10/03/2017 0  0 - 2 % Final    ABS. NEUTROPHILS 10/03/2017 7.9  1.8 - 8.0 K/UL Final    ABS.  LYMPHOCYTES 10/03/2017 1.2  0.9 - 3.6 K/UL Final    ABS. MONOCYTES 10/03/2017 0.9  0.05 - 1.2 K/UL Final    ABS. EOSINOPHILS 10/03/2017 0.1  0.0 - 0.4 K/UL Final    ABS. BASOPHILS 10/03/2017 0.0  0.0 - 0.1 K/UL Final    DF 10/03/2017 AUTOMATED    Final    Sodium 10/03/2017 138  136 - 145 mmol/L Final    Potassium 10/03/2017 3.4* 3.5 - 5.5 mmol/L Final    Chloride 10/03/2017 100  100 - 108 mmol/L Final    CO2 10/03/2017 31  21 - 32 mmol/L Final    Anion gap 10/03/2017 7  3.0 - 18 mmol/L Final    Glucose 10/03/2017 93  74 - 99 mg/dL Final    BUN 10/03/2017 24* 7.0 - 18 MG/DL Final    Creatinine 10/03/2017 0.96  0.6 - 1.3 MG/DL Final    BUN/Creatinine ratio 10/03/2017 25* 12 - 20   Final    GFR est AA 10/03/2017 >60  >60 ml/min/1.73m2 Final    GFR est non-AA 10/03/2017 >60  >60 ml/min/1.73m2 Final    Comment: (NOTE)  Estimated GFR is calculated using the Modification of Diet in Renal   Disease (MDRD) Study equation, reported for both  Americans   (GFRAA) and non- Americans (GFRNA), and normalized to 1.73m2   body surface area. The physician must decide which value applies to   the patient. The MDRD study equation should only be used in   individuals age 25 or older. It has not been validated for the   following: pregnant women, patients with serious comorbid conditions,   or on certain medications, or persons with extremes of body size,   muscle mass, or nutritional status.  Calcium 10/03/2017 8.7  8.5 - 10.1 MG/DL Final    Bilirubin, total 10/03/2017 0.6  0.2 - 1.0 MG/DL Final    ALT (SGPT) 10/03/2017 57  16 - 61 U/L Final    AST (SGOT) 10/03/2017 17  15 - 37 U/L Final    Alk. phosphatase 10/03/2017 115  45 - 117 U/L Final    Protein, total 10/03/2017 6.9  6.4 - 8.2 g/dL Final    Albumin 10/03/2017 3.8  3.4 - 5.0 g/dL Final    Globulin 10/03/2017 3.1  2.0 - 4.0 g/dL Final    A-G Ratio 10/03/2017 1.2  0.8 - 1.7   Final       .No results found for any visits on 10/15/19.     Assessment / Plan      ICD-10-CM ICD-9-CM    1. Pain of left hand M79.642 729.5 XR HAND LT MIN 3 V   2. Left wrist pain M25.532 719.43 XR WRIST LT AP/LAT/OBL MIN 3V   3. Skin tear of left elbow without complication, initial encounter S51.012A 881.01    4. Tear of skin of left wrist, initial encounter S61.512A 881.02    5. Dementia with behavioral disturbance, unspecified dementia type ([facility] F03.91 294.21    6. Fall, initial encounter [address] Alesha Adkins P483.2        X-ray of left hand and left wrist  Skin tears were dressed with Betadine and a patient's son was shown how to dress the skin tears  he was advised to continue his maintenance medications      Follow-up and Dispositions    · Return in about 3 days (around 10/18/2019). I asked the patient and his son for any questions and I answered their questions. They state that they understand the treatment plan and agree with the treatment plan          THIS DOCUMENT WAS CREATED WITH A VOICE ACTIVATED DICTATION SYSTEM.   IT MAY CONTAIN TRANSCRIPTION ERRORS

## 2019-11-13 RX ORDER — LIDOCAINE 50 MG/G
PATCH TOPICAL
Qty: 30 EACH | Refills: 2 | Status: SHIPPED | OUTPATIENT
Start: 2019-11-13 | End: 2021-04-01 | Stop reason: SDUPTHER

## 2019-11-13 NOTE — TELEPHONE ENCOUNTER
Last seen 10/15/19  Next visit   None    Requested Prescriptions     Pending Prescriptions Disp Refills    lidocaine (LIDODERM) 5 % 1 Each      Sig: Apply patch to the affected area for 12 hours a day and remove for 12 hours a day.

## 2019-12-12 DIAGNOSIS — E03.9 HYPOTHYROIDISM, UNSPECIFIED TYPE: ICD-10-CM

## 2019-12-15 RX ORDER — LEVOTHYROXINE SODIUM 25 UG/1
TABLET ORAL
Qty: 90 TAB | Refills: 1 | Status: SHIPPED | OUTPATIENT
Start: 2019-12-15 | End: 2020-06-25

## 2020-01-10 DIAGNOSIS — M79.642 PAIN OF LEFT HAND: ICD-10-CM

## 2020-01-10 DIAGNOSIS — M25.532 LEFT WRIST PAIN: ICD-10-CM

## 2020-01-17 RX ORDER — AMLODIPINE BESYLATE 5 MG/1
TABLET ORAL
Qty: 30 TAB | Refills: 0 | Status: SHIPPED | OUTPATIENT
Start: 2020-01-17 | End: 2020-02-21

## 2020-05-26 RX ORDER — MEMANTINE HYDROCHLORIDE 10 MG/1
TABLET ORAL
Qty: 60 TAB | Refills: 0 | Status: SHIPPED | OUTPATIENT
Start: 2020-05-26 | End: 2020-06-25 | Stop reason: SDUPTHER

## 2020-05-26 NOTE — TELEPHONE ENCOUNTER
Patient scheduled for virtual visit on 6/5. Request enough to get him to his visit.     Requested Prescriptions     Pending Prescriptions Disp Refills    memantine (NAMENDA) 10 mg tablet 60 Tab 1

## 2020-06-25 ENCOUNTER — VIRTUAL VISIT (OUTPATIENT)
Dept: FAMILY MEDICINE CLINIC | Facility: CLINIC | Age: 85
End: 2020-06-25

## 2020-06-25 DIAGNOSIS — I10 BENIGN ESSENTIAL HYPERTENSION: ICD-10-CM

## 2020-06-25 DIAGNOSIS — Z13.31 SCREENING FOR DEPRESSION: ICD-10-CM

## 2020-06-25 DIAGNOSIS — E03.9 HYPOTHYROIDISM, UNSPECIFIED TYPE: ICD-10-CM

## 2020-06-25 DIAGNOSIS — Z00.00 MEDICARE ANNUAL WELLNESS VISIT, SUBSEQUENT: ICD-10-CM

## 2020-06-25 DIAGNOSIS — R41.3 MEMORY LOSS: Primary | ICD-10-CM

## 2020-06-25 DIAGNOSIS — Z13.39 SCREENING FOR ALCOHOLISM: ICD-10-CM

## 2020-06-25 RX ORDER — LEVOTHYROXINE SODIUM 25 UG/1
TABLET ORAL
Qty: 90 TAB | Refills: 0 | Status: SHIPPED | OUTPATIENT
Start: 2020-06-25 | End: 2020-09-22

## 2020-06-25 RX ORDER — AMLODIPINE BESYLATE 5 MG/1
TABLET ORAL
Qty: 90 TAB | Refills: 0 | Status: SHIPPED | OUTPATIENT
Start: 2020-06-25 | End: 2020-09-29

## 2020-06-25 RX ORDER — MEMANTINE HYDROCHLORIDE 10 MG/1
TABLET ORAL
Qty: 180 TAB | Refills: 1 | Status: SHIPPED | OUTPATIENT
Start: 2020-06-25 | End: 2021-04-01 | Stop reason: SDUPTHER

## 2020-06-25 NOTE — PATIENT INSTRUCTIONS
Medicare Wellness Visit, Male The best way to live healthy is to have a lifestyle where you eat a well-balanced diet, exercise regularly, limit alcohol use, and quit all forms of tobacco/nicotine, if applicable. Regular preventive services are another way to keep healthy. Preventive services (vaccines, screening tests, monitoring & exams) can help personalize your care plan, which helps you manage your own care. Screening tests can find health problems at the earliest stages, when they are easiest to treat. [facility] follows the current, evidence-based guidelines published by the [facility] ([facility]) when recommending preventive services for our patients. Because we follow these guidelines, sometimes recommendations change over time as research supports it. (For example, a prostate screening blood test is no longer routinely recommended for men with no symptoms). Of course, you and your doctor may decide to screen more often for some diseases, based on your risk and co-morbidities (chronic disease you are already diagnosed with). Preventive services for you include: - Medicare offers their members a free annual wellness visit, which is time for you and your primary care provider to discuss and plan for your preventive service needs. Take advantage of this benefit every year! 
-All adults over age 72 should receive the recommended pneumonia vaccines. Current USPSTF guidelines recommend a series of two vaccines for the best pneumonia protection.  
-All adults should have a flu vaccine yearly and tetanus vaccine every 10 years. 
-All adults age 48 and older should receive the shingles vaccines (series of two vaccines).       
-All adults age 38-68 who are overweight should have a diabetes screening test once every three years.  
-Other screening tests & preventive services for persons with diabetes include: an eye exam to screen for diabetic retinopathy, a kidney function test, a foot exam, and stricter control over your cholesterol.  
-Cardiovascular screening for adults with routine risk involves an electrocardiogram (ECG) at intervals determined by the provider.  
-Colorectal cancer screening should be done for adults age 54-65 with no increased risk factors for colorectal cancer. There are a number of acceptable methods of screening for this type of cancer. Each test has its own benefits and drawbacks. Discuss with your provider what is most appropriate for you during your annual wellness visit. The different tests include: colonoscopy (considered the best screening method), a fecal occult blood test, a fecal DNA test, and sigmoidoscopy. 
-All adults born between [facility] should be screened once for Hepatitis C. 
-An Abdominal Aortic Aneurysm (AAA) Screening is recommended for men age 73-68 who has ever smoked in their lifetime. Here is a list of your current Health Maintenance items (your personalized list of preventive services) with a due date: 
Health Maintenance Due Topic Date Due  
 DTaP/Tdap/Td  (1 - Tdap) 09/13/1943  Shingles Vaccine (1 of 2) 09/13/1972 [facility] Annual Well Visit  03/14/2018  Glaucoma Screening   10/03/2019

## 2020-06-25 NOTE — PROGRESS NOTES
This is the Subsequent Medicare Annual Wellness Exam, performed 12 months or more after the Initial AWV or the last Subsequent AWV    Consent: Meagan Redman, who was seen by synchronous (real-time) audio-video technology, and/or his healthcare decision maker, is aware that this patient-initiated, Telehealth encounter on 6/25/2020 is a billable service. While AWVs are fully covered by Medicare, any services rendered on this date that are not included in an AWV are subject to additional billing, with coverage as determined by his insurance carrier. He is aware that he may receive a bill for any such additional services and has provided verbal consent to proceed: Yes. I have reviewed the patient's medical history in detail and updated the computerized patient record.      History     Patient Active Problem List   Diagnosis Code    Memory loss R41.3    Hyperplasia of prostate with urinary obstruction N40.1, N13.8    Asbestosis ([facility] J61    Benign essential hypertension I10    Pure hypercholesterolemia E78.00    Acquired hypothyroidism E03.9    Dermatitis L30.9     Past Medical History:   Diagnosis Date    [facility]bestosis ([facility]     Benign essential hypertension     Hyperplasia of prostate with urinary obstruction     Memory loss     Pure hypercholesterolemia       Past Surgical History:   Procedure Laterality Date    HX APPENDECTOMY      HX CHOLECYSTECTOMY  7/8/1997    HX OTHER SURGICAL  10/1999    repair of abdominal aortic aneursym     Current Outpatient Medications   Medication Sig Dispense Refill    amLODIPine (NORVASC) 5 mg tablet take 1 tablet by mouth once daily 30 Tab 0    memantine (NAMENDA) 10 mg tablet take 1 tablet by mouth twice a day 60 Tab 0    diclofenac (VOLTAREN) 1 % gel Apply 4 grams three times per day 100 g 3    levothyroxine (SYNTHROID) 25 mcg tablet take 1 tablet by mouth every morning ON AN EMPTY STOMACH 30 Tab 0    fluocinoNIDE (LIDEX) 0.05 % topical cream Apply twice per day 60 g 1    lidocaine (LIDODERM) 5 % Apply 1 patch as directed for 12 hours every 24 hours (12 hours on, 12 hours off) 30 Each 2     Allergies   Allergen Reactions    Avelox [Moxifloxacin] Rash    Clindamycin Rash    Penicillin V Potassium Rash    Zithromax [Azithromycin] Rash     Rash on both hands       Family History   Problem Relation Age of Onset    No Known Problems Mother     No Known Problems Father      Social History     Tobacco Use    Smoking status: Former Smoker    Smokeless tobacco: Never Used   Substance Use Topics    Alcohol use: No       Depression Risk Factor Screening:     3 most recent PHQ Screens 9/19/2017   Little interest or pleasure in doing things Not at all   Feeling down, depressed, irritable, or hopeless Not at all   Total Score PHQ 2 0       Alcohol Risk Factor Screening (MALE > 65): Do you average more 1 drink per night or more than 7 drinks a week: No    In the past three months have you have had more than 4 drinks containing alcohol on one occasion: No      Functional Ability and Level of Safety:   Hearing: Hearing is good. Activities of Daily Living: The home contains: handrails and grab bars  Patient needs help with:  transportation, shopping, laundry, housework, managing medications and managing money     Ambulation: with difficulty, uses a cane     Fall Risk:  Fall Risk Assessment, last 12 mths 10/15/2019   Able to walk? Yes   Fall in past 12 months? Yes   Fall with injury?  Yes   Number of falls in past 12 months 1   Fall Risk Score 2     Abuse Screen:  Patient is not abused       Cognitive Screening   Has your family/caregiver stated any concerns about your memory: yes - diagnosis of memory loss    Cognitive Screening: takes Namenda for memory    Patient Care Team   Patient Care Team:  Romelia Calabrese MD as PCP - General (Internal Medicine)    Assessment/Plan   Education and counseling provided:  Are appropriate based on today's review and evaluation    Diagnoses and all orders for this visit:    1. Medicare annual wellness visit, subsequent  -     [place] ANNUAL ALCOHOL SCREEN [address]    2. Screening for alcoholism  -     [place] ANNUAL ALCOHOL SCREEN 15 MIN    3. Screening for depression  -     [place] Maintenance Due   Topic Date Due    DTaP/Tdap/Td series (1 - Tdap) 09/13/1943    Shingrix Vaccine Age 50> (1 of 2) 09/13/1972    Medicare Yearly Exam  03/14/2018    GLAUCOMA SCREENING Q2Y  10/03/2019       Demetra Magallanes is a 80 y.o. male who was evaluated by an audio-video encounter for concerns as above. Patient identification was verified prior to start of the visit. A caregiver was present when appropriate. Due to this being a TeleHealth encounter (During TTYIJ-54 public health emergency), evaluation of the following organ systems was limited: Vitals/Constitutional/EENT/Resp/CV/GI//MS/Neuro/Skin/Heme-Lymph-Imm. Pursuant to the emergency declaration under the [facility] waiver authority and the [employer] Act, this Virtual Visit was conducted, with patient's (and/or legal guardian's) consent, to reduce the patient's risk of exposure to COVID-19 and provide necessary medical care. Services were provided through a synchronous discussion virtually to substitute for in-person clinic visit. I was at home. The patient was at home.       Briana Bran NP

## 2020-06-25 NOTE — PROGRESS NOTES
Juan Fernández is a 80 y.o. male who was seen by synchronous (real-time) audio-video technology on 6/25/2020. Consent: Juan Fernández, who was seen by synchronous (real-time) audio-video technology, and/or his healthcare decision maker, is aware that this patient-initiated, Telehealth encounter on 6/25/2020 is a billable service, with coverage as determined by his insurance carrier. He is aware that he may receive a bill and has provided verbal consent to proceed: Yes. Assessment & Plan:   Diagnoses and all orders for this visit:    1. Memory loss  -     memantine (NAMENDA) 10 mg tablet; take 1 tablet by mouth twice a day    2. Medicare annual wellness visit, subsequent  -     [place] ANNUAL ALCOHOL SCREEN [address]    3. Screening for alcoholism  -     UT ANNUAL ALCOHOL SCREEN 15 MIN    4. Screening for depression  -     DEPRESSION SCREEN ANNUAL    5. Hypothyroidism, unspecified type  -     levothyroxine (SYNTHROID) 25 mcg tablet; take 1 tablet by mouth every morning ON AN EMPTY STOMACH    6. Benign essential hypertension  -     amLODIPine (NORVASC) 5 mg tablet; take 1 tablet by mouth once daily        I spent at least 15 minutes on this visit with this established patient. Subjective:   Juan Fernández is a 80 y.o. male who was seen for Hypertension  This patient is a former patient of Dr. Phoenix Garland. He has previous medical history for hypertension, memory loss and hyperlipidemia. The patient presents for an Audio-visual teleconference appointment for routine follow-up care. Notes he feels well today. He denies any chest pain, palpitation, lower extremity edema. He is negative for cough or wheezing. Hypertension-notes his blood pressure have been controlled. He is compliant with taking his medication daily. Hypothyroidism-prescribe levothyroxine 25 mcg daily.   Last TSH level was in 2017 was 3.73    Prior to Admission medications    Medication Sig Start Date End Date Taking?  Authorizing Provider   amLODIPine (NORVASC) 5 mg tablet take 1 tablet by mouth once daily 6/25/20  Yes Teo Bill NP   levothyroxine (SYNTHROID) 25 mcg tablet take 1 tablet by mouth every morning ON AN EMPTY STOMACH 6/25/20  Yes Teo Bill NP   memantine (NAMENDA) 10 mg tablet take 1 tablet by mouth twice a day 6/25/20  Yes Teo Bill NP   diclofenac (VOLTAREN) 1 % gel Apply 4 grams three times per day 9/30/19  Yes Clarissa Stoner MD   fluocinoNIDE (LIDEX) 0.05 % topical cream Apply twice per day 9/22/17  Yes Clarissa Stoner MD   lidocaine (LIDODERM) 5 % Apply 1 patch as directed for 12 hours every 24 hours (12 hours on, 12 hours off) 11/13/19   Clarissa Stoner MD     Allergies   Allergen Reactions    Avelox [Moxifloxacin] Rash    Clindamycin Rash    Penicillin V Potassium Rash    Zithromax [Azithromycin] Rash     Rash on both hands       Patient Active Problem List   Diagnosis Code    Memory loss R41.3    Hyperplasia of prostate with urinary obstruction N40.1, N13.8    [facility] J61    Benign essential hypertension I10    Pure hypercholesterolemia E78.00    Acquired hypothyroidism E03.9    Dermatitis L30.9     Patient Active Problem List    Diagnosis Date Noted    Dermatitis 09/27/2017    Acquired hypothyroidism 01/23/2017    Memory loss     Hyperplasia of prostate with urinary obstruction     Asbestosis ([facility]     Benign essential hypertension     Pure hypercholesterolemia      Current Outpatient Medications   Medication Sig Dispense Refill    amLODIPine (NORVASC) 5 mg tablet take 1 tablet by mouth once daily 90 Tab 0    levothyroxine (SYNTHROID) 25 mcg tablet take 1 tablet by mouth every morning ON AN EMPTY STOMACH 90 Tab 0    memantine (NAMENDA) 10 mg tablet take 1 tablet by mouth twice a day 180 Tab 1    diclofenac (VOLTAREN) 1 % gel Apply 4 grams three times per day 100 g 3    fluocinoNIDE (LIDEX) 0.05 % topical cream Apply twice per day 60 g 1    lidocaine (LIDODERM) 5 % Apply 1 patch as directed for 12 hours every 24 hours (12 hours on, 12 hours off) 30 Each 2     Allergies   Allergen Reactions    Avelox [Moxifloxacin] Rash    Clindamycin Rash    Penicillin V Potassium Rash    Zithromax [Azithromycin] Rash     Rash on both hands     Past Medical History:   Diagnosis Date    [facility]bestosis ([facility]     Benign essential hypertension     Hyperplasia of prostate with urinary obstruction     Memory loss     Pure hypercholesterolemia        ROS    Constitutional: No apparent distress noted  General- negative for fever, chills or fatigue  Eyes- negative visual changes  CV- denies chest pain, palpitation  Pul: negative cough or SOB  GI: negative nausea, flank pain, diarrhea, constipation  Urinary:- No dysuria or polyuria  MS- negative myalgia, negative joint pain  Neuro- negative headache, dizziness or weakness  Skin- negative for rashes or lesions. Psych- memory deficits     Objective:   Vital Signs: (As obtained by patient/caregiver at home)  There were no vitals taken for this visit.      [INSTRUCTIONS:  \"[x]\" Indicates a positive item  \"[]\" Indicates a negative item  -- DELETE ALL ITEMS NOT EXAMINED]    Constitutional: [x] Appears well-developed and well-nourished [x] No apparent distress      [] Abnormal -     Mental status: [x] Alert and awake  [x] Oriented to person/place/time [x] Able to follow commands    [] Abnormal -     Eyes:   EOM    [x]  Normal    [] Abnormal -   Sclera  [x]  Normal    [] Abnormal -          Discharge [x]  None visible   [] Abnormal -     HENT: [x] Normocephalic, atraumatic  [] Abnormal -   [x] Mouth/Throat: Mucous membranes are moist    External Ears [x] Normal  [] Abnormal -    Neck: [x] No visualized mass [] Abnormal -     Pulmonary/Chest: [x] Respiratory effort normal   [x] No visualized signs of difficulty breathing or respiratory distress        [] Abnormal -      Musculoskeletal:   [x] Normal gait with no signs of ataxia         [x] Normal range of motion of neck        [] Abnormal -     Neurological:        [x] No Facial Asymmetry (Cranial nerve 7 motor function) (limited exam due to video visit)          [x] No gaze palsy        [] Abnormal -          Skin:        [x] No significant exanthematous lesions or discoloration noted on facial skin         [] Abnormal -            Psychiatric:       [x] Normal Affect [] Abnormal -        [x] No Hallucinations    Other pertinent observable physical exam findings:-        We discussed the expected course, resolution and complications of the diagnosis(es) in detail. Medication risks, benefits, costs, interactions, and alternatives were discussed as indicated. I advised him to contact the office if his condition worsens, changes or fails to improve as anticipated. He expressed understanding with the diagnosis(es) and plan. Juan Fernández is a 80 y.o. male who was evaluated by a video visit encounter for concerns as above. Patient identification was verified prior to start of the visit. A caregiver was present when appropriate. Due to this being a TeleHealth encounter ([facility]), evaluation of the following organ systems was limited: Vitals/Constitutional/EENT/Resp/CV/GI//MS/Neuro/Skin/Heme-Lymph-Imm. Pursuant to the emergency declaration under the [facility] waiver authority and the [employer] Act, this Virtual  Visit was conducted, with patient's (and/or legal guardian's) consent, to reduce the patient's risk of exposure to COVID-19 and provide necessary medical care. Services were provided through a video synchronous discussion virtually to substitute for in-person clinic visit. Patient and provider were located at their individual homes.       Elaina Wu NP

## 2020-09-22 DIAGNOSIS — E03.9 HYPOTHYROIDISM, UNSPECIFIED TYPE: ICD-10-CM

## 2020-09-22 RX ORDER — LEVOTHYROXINE SODIUM 25 UG/1
TABLET ORAL
Qty: 90 TAB | Refills: 0 | Status: SHIPPED | OUTPATIENT
Start: 2020-09-22 | End: 2020-12-16

## 2020-09-29 DIAGNOSIS — I10 BENIGN ESSENTIAL HYPERTENSION: ICD-10-CM

## 2020-09-29 RX ORDER — AMLODIPINE BESYLATE 5 MG/1
TABLET ORAL
Qty: 90 TAB | Refills: 0 | Status: SHIPPED | OUTPATIENT
Start: 2020-09-29 | End: 2020-12-30

## 2020-12-28 DIAGNOSIS — I10 BENIGN ESSENTIAL HYPERTENSION: ICD-10-CM

## 2020-12-30 RX ORDER — AMLODIPINE BESYLATE 5 MG/1
TABLET ORAL
Qty: 90 TAB | Refills: 0 | Status: SHIPPED | OUTPATIENT
Start: 2020-12-30 | End: 2021-04-01

## 2021-03-04 DIAGNOSIS — E03.9 HYPOTHYROIDISM, UNSPECIFIED TYPE: ICD-10-CM

## 2021-03-04 RX ORDER — LEVOTHYROXINE SODIUM 25 UG/1
TABLET ORAL
Qty: 30 TAB | Refills: 0 | Status: SHIPPED | OUTPATIENT
Start: 2021-03-04 | End: 2021-03-29

## 2021-03-04 NOTE — TELEPHONE ENCOUNTER
Requested Prescriptions     Pending Prescriptions Disp Refills    levothyroxine (SYNTHROID) 25 mcg tablet 60 Tab 0     Patient has appointment on 03/30/21 .  Has 2 pills left and going out of town please advise

## 2021-03-27 DIAGNOSIS — E03.9 HYPOTHYROIDISM, UNSPECIFIED TYPE: ICD-10-CM

## 2021-03-29 RX ORDER — LEVOTHYROXINE SODIUM 25 UG/1
TABLET ORAL
Qty: 30 TAB | Refills: 0 | Status: SHIPPED | OUTPATIENT
Start: 2021-03-29 | End: 2021-03-30 | Stop reason: SDUPTHER

## 2021-03-30 DIAGNOSIS — E03.9 HYPOTHYROIDISM, UNSPECIFIED TYPE: ICD-10-CM

## 2021-03-30 NOTE — TELEPHONE ENCOUNTER
Requested Prescriptions     Pending Prescriptions Disp Refills    levothyroxine (SYNTHROID) 25 mcg tablet 30 Tab 0

## 2021-04-01 DIAGNOSIS — R41.3 MEMORY LOSS: ICD-10-CM

## 2021-04-01 RX ORDER — LEVOTHYROXINE SODIUM 25 UG/1
TABLET ORAL
Qty: 30 TAB | Refills: 0 | Status: SHIPPED | OUTPATIENT
Start: 2021-04-01 | End: 2021-04-30 | Stop reason: SDUPTHER

## 2021-04-01 NOTE — TELEPHONE ENCOUNTER
Last seen 03/30/21  Next appt  04/30/21    Requested Prescriptions     Pending Prescriptions Disp Refills    lidocaine (LIDODERM) 5 % 30 Each 2     Sig: Apply 1 patch as directed for 12 hours every 24 hours (12 hours on, 12 hours off)    memantine (NAMENDA) 10 mg tablet 180 Tab 1     Sig: take 1 tablet by mouth twice a day

## 2021-04-05 RX ORDER — LIDOCAINE 50 MG/G
PATCH TOPICAL
Qty: 30 EACH | Refills: 2 | Status: SHIPPED | OUTPATIENT
Start: 2021-04-05 | End: 2021-04-30 | Stop reason: SDUPTHER

## 2021-04-05 RX ORDER — MEMANTINE HYDROCHLORIDE 10 MG/1
TABLET ORAL
Qty: 180 TAB | Refills: 1 | Status: SHIPPED | OUTPATIENT
Start: 2021-04-05 | End: 2021-04-30 | Stop reason: SDUPTHER

## 2021-04-30 ENCOUNTER — VIRTUAL VISIT (OUTPATIENT)
Dept: FAMILY MEDICINE CLINIC | Age: 86
End: 2021-04-30
Payer: MEDICARE

## 2021-04-30 DIAGNOSIS — R41.3 MEMORY LOSS: ICD-10-CM

## 2021-04-30 DIAGNOSIS — E78.00 PURE HYPERCHOLESTEROLEMIA: Primary | ICD-10-CM

## 2021-04-30 DIAGNOSIS — E03.9 HYPOTHYROIDISM, UNSPECIFIED TYPE: ICD-10-CM

## 2021-04-30 DIAGNOSIS — I10 BENIGN ESSENTIAL HYPERTENSION: ICD-10-CM

## 2021-04-30 DIAGNOSIS — F03.91 DEMENTIA WITH BEHAVIORAL DISTURBANCE, UNSPECIFIED DEMENTIA TYPE: ICD-10-CM

## 2021-04-30 DIAGNOSIS — M54.50 LUMBAR BACK PAIN: ICD-10-CM

## 2021-04-30 PROCEDURE — G8432 DEP SCR NOT DOC, RNG: HCPCS | Performed by: NURSE PRACTITIONER

## 2021-04-30 PROCEDURE — 1101F PT FALLS ASSESS-DOCD LE1/YR: CPT | Performed by: NURSE PRACTITIONER

## 2021-04-30 PROCEDURE — 99214 OFFICE O/P EST MOD 30 MIN: CPT | Performed by: NURSE PRACTITIONER

## 2021-04-30 PROCEDURE — G8427 DOCREV CUR MEDS BY ELIG CLIN: HCPCS | Performed by: NURSE PRACTITIONER

## 2021-04-30 RX ORDER — FLUOCINONIDE 0.5 MG/G
CREAM TOPICAL
Qty: 60 G | Refills: 1 | Status: CANCELLED | OUTPATIENT
Start: 2021-04-30

## 2021-04-30 RX ORDER — MEMANTINE HYDROCHLORIDE 10 MG/1
TABLET ORAL
Qty: 180 TAB | Refills: 1 | Status: SHIPPED | OUTPATIENT
Start: 2021-04-30

## 2021-04-30 RX ORDER — LEVOTHYROXINE SODIUM 25 UG/1
TABLET ORAL
Qty: 90 TAB | Refills: 1 | Status: SHIPPED | OUTPATIENT
Start: 2021-04-30

## 2021-04-30 RX ORDER — AMLODIPINE BESYLATE 5 MG/1
TABLET ORAL
Qty: 90 TAB | Refills: 1 | Status: SHIPPED | OUTPATIENT
Start: 2021-04-30

## 2021-04-30 RX ORDER — DICLOFENAC SODIUM 10 MG/G
GEL TOPICAL
Qty: 100 G | Refills: 3 | Status: SHIPPED | OUTPATIENT
Start: 2021-04-30

## 2021-04-30 RX ORDER — LIDOCAINE 50 MG/G
PATCH TOPICAL
Qty: 30 EACH | Refills: 2 | Status: SHIPPED | OUTPATIENT
Start: 2021-04-30

## 2021-04-30 NOTE — PROGRESS NOTES
Stewart Szymanski presents today for   Chief Complaint   Patient presents with    Medication Refill    Hypertension     follow-up       Virtual/telephone visit    Depression Screening:  3 most recent PHQ Screens 4/30/2021   Little interest or pleasure in doing things Not at all   Feeling down, depressed, irritable, or hopeless Not at all   Total Score PHQ 2 0       Learning Assessment:  Learning Assessment 11/3/2015   PRIMARY LEARNER Patient   HIGHEST LEVEL OF EDUCATION - PRIMARY LEARNER  DID NOT GRADUATE HIGH SCHOOL   BARRIERS PRIMARY LEARNER NONE   CO-LEARNER CAREGIVER Yes   CO-LEARNER NAME yuridia Pavon HIGHEST LEVEL OF EDUCATION SOME COLLEGE   BARRIERS CO-LEARNER NONE   PRIMARY LANGUAGE ENGLISH   PRIMARY LANGUAGE CO-LEARNER ENGLISH    NEED No   LEARNER PREFERENCE PRIMARY DEMONSTRATION     READING     VIDEOS     PICTURES   LEARNER PREFERENCE CO-LEARNER DEMONSTRATION   ANSWERED BY son   RELATIONSHIP OTHER       Abuse Screening:  Abuse Screening Questionnaire 4/30/2021   Do you ever feel afraid of your partner? N   Are you in a relationship with someone who physically or mentally threatens you? N   Is it safe for you to go home? Y       Fall Risk  Fall Risk Assessment, last 12 mths 4/30/2021   Able to walk? Yes   Fall in past 12 months? 0   Do you feel unsteady? 0   Are you worried about falling 0   Number of falls in past 12 months -   Fall with injury? -         Health Maintenance reviewed and discussed and ordered per Provider. Health Maintenance Due   Topic Date Due    COVID-19 Vaccine (1) Never done    DTaP/Tdap/Td series (1 - Tdap) Never done    Shingrix Vaccine Age 50> (1 of 2) Never done   . Coordination of Care:  1. Have you been to the ER, urgent care clinic since your last visit? Hospitalized since your last visit? no    2. Have you seen or consulted any other health care providers outside of the [address] [place] [address] since your last visit?  Include any pap smears or colon screening.  no

## 2021-04-30 NOTE — PROGRESS NOTES
Joaquim Cerda is a 80 y.o. male who was seen by synchronous (real-time) audio-video technology on 4/30/2021. Consent: Joaquim Cerda, who was seen by synchronous (real-time) audio-video technology, and/or his healthcare decision maker, is aware that this patient-initiated, Telehealth encounter on 4/30/2021 is a billable service, with coverage as determined by his insurance carrier. He is aware that he may receive a bill and has provided verbal consent to proceed: Yes. Assessment & Plan:   Diagnoses and all orders for this visit:    1. Pure hypercholesterolemia  -     LIPID PANEL; Future    2. Memory loss  -     memantine (NAMENDA) 10 mg tablet; take 1 tablet by mouth twice a day  -     [facility]    3. Benign essential hypertension  -     amLODIPine (NORVASC) 5 mg tablet; take 1 tablet by mouth once daily  -     LIPID PANEL; Future  -     METABOLIC PANEL, COMPREHENSIVE; Future  -     CBC WITH AUTOMATED DIFF; Future    4. Hypothyroidism, unspecified type  -     levothyroxine (SYNTHROID) 25 mcg tablet; take 1 tablet by mouth every morning ON AN EMPTY STOMACH  -     TSH 3RD GENERATION; Future    5. Dementia with behavioral disturbance, unspecified dementia type ([facility]  -     [facility]    6. Lumbar back pain  -     lidocaine (LIDODERM) 5 %; Apply 1 patch as directed for 12 hours every 24 hours (12 hours on, 12 hours off)  -     diclofenac (VOLTAREN) 1 % gel; Apply 4 grams three times per day    Fasting labs ordered  Medication refills  Denied any side effects or adverse reaction to any of the medications    I spent at least 15 minutes on this visit with this established patient. Subjective:   Joaquim Cerda is a 80 y.o. male who was seen for Medication Refill and Hypertension (follow-up)    He carries a medical diagnosis for hypertension, memory loss and hyperlipidemia. The patient presents for an Audio-visual teleconference appointment for routine follow-up care.  Notes he feels well today. He denies any chest pain, palpitation, lower extremity edema. He is negative for cough or wheezing. Hypertension-notes his blood pressure have been controlled. He is compliant with taking his medication daily. Hypothyroidism-prescribe levothyroxine 25 mcg daily. Last TSH level was in 2017 was 3.73  Dementia- patient daughter requesting assistance with [facility] aid. Prior to Admission medications    Medication Sig Start Date End Date Taking?  Authorizing Provider   memantine [facility]) 10 mg tablet take 1 tablet by mouth twice a day 4/30/21  Yes Wan Layton NP   amLODIPine (NORVASC) 5 mg tablet take 1 tablet by mouth once daily 4/30/21  Yes Wan Layton NP   lidocaine (LIDODERM) 5 % Apply 1 patch as directed for 12 hours every 24 hours (12 hours on, 12 hours off) 4/30/21  Yes Wan Layton NP   levothyroxine (SYNTHROID) 25 mcg tablet take 1 tablet by mouth every morning ON AN EMPTY STOMACH 4/30/21  Yes Wan Layton NP   diclofenac (VOLTAREN) 1 % gel Apply 4 grams three times per day 4/30/21  Yes Wan Layton NP   fluocinoNIDE (LIDEX) 0.05 % topical cream Apply twice per day 9/22/17  Yes Ayla Ruby MD   lidocaine (LIDODERM) 5 % Apply 1 patch as directed for 12 hours every 24 hours (12 hours on, 12 hours off) 4/5/21 4/30/21  Wan Layton NP   memantine (NAMENDA) 10 mg tablet take 1 tablet by mouth twice a day 4/5/21 4/30/21  Wan Layton NP   levothyroxine (SYNTHROID) 25 mcg tablet take 1 tablet by mouth every morning ON AN EMPTY STOMACH 4/1/21 4/30/21  Wan Layton NP   amLODIPine (NORVASC) 5 mg tablet take 1 tablet by mouth once daily 4/1/21 4/30/21  Wan Layton NP   diclofenac (VOLTAREN) 1 % gel Apply 4 grams three times per day 9/30/19 4/30/21  Ayla Ruby MD     Allergies   Allergen Reactions    Avelox [Moxifloxacin] Rash    Clindamycin Rash    Penicillin V Potassium Rash    Zithromax [Azithromycin] Rash     Rash on both hands       Patient Active Problem List   Diagnosis Code    Memory loss R41.3    Hyperplasia of prostate with urinary obstruction N40.1, N13.8    Asbestosis ([facility] J61    Benign essential hypertension I10    Pure hypercholesterolemia E78.00    Acquired hypothyroidism E03.9    Dermatitis L30.9     Patient Active Problem List    Diagnosis Date Noted    Dermatitis 09/27/2017    Acquired hypothyroidism 01/23/2017    Memory loss     Hyperplasia of prostate with urinary obstruction     [email or web address]     Benign essential hypertension     Pure hypercholesterolemia      Current Outpatient Medications   Medication Sig Dispense Refill    memantine (NAMENDA) 10 mg tablet take 1 tablet by mouth twice a day 180 Tab 1    amLODIPine (NORVASC) 5 mg tablet take 1 tablet by mouth once daily 90 Tab 1    lidocaine (LIDODERM) 5 % Apply 1 patch as directed for 12 hours every 24 hours (12 hours on, 12 hours off) 30 Each 2    levothyroxine (SYNTHROID) 25 mcg tablet take 1 tablet by mouth every morning ON AN EMPTY STOMACH 90 Tab 1    diclofenac (VOLTAREN) 1 % gel Apply 4 grams three times per day 100 g 3    fluocinoNIDE (LIDEX) 0.05 % topical cream Apply twice per day 60 g 1     Allergies   Allergen Reactions    Avelox [Moxifloxacin] Rash    Clindamycin Rash    Penicillin V Potassium Rash    Zithromax [Azithromycin] Rash     Rash on both hands     Past Medical History:   Diagnosis Date    [facility]bestosis ([facility]     Benign essential hypertension     Hyperplasia of prostate with urinary obstruction     Memory loss     Pure hypercholesterolemia        ROS    Constitutional: No apparent distress noted  General- negative for fever, chills or fatigue  Eyes- negative visual changes  CV- denies chest pain, palpitation  Pul: negative cough or SOB  GI: negative nausea, flank pain, diarrhea, constipation  Urinary:- No dysuria or polyuria  MS- negative myalgia, negative joint pain  Neuro- negative headache, dizziness or weakness  Skin- negative for rashes or lesions. Psych- memory deficits     Objective:   Vital Signs: (As obtained by patient/caregiver at home)  There were no vitals taken for this visit. [INSTRUCTIONS:  \"[x]\" Indicates a positive item  \"[]\" Indicates a negative item  -- DELETE ALL ITEMS NOT EXAMINED]    Constitutional: [x] Appears well-developed and well-nourished [x] No apparent distress      [] Abnormal -     Mental status: [x] Alert and awake  [x] Oriented to person/place/time [x] Able to follow commands    [] Abnormal -     Eyes:   EOM    [x]  Normal    [] Abnormal -   Sclera  [x]  Normal    [] Abnormal -          Discharge [x]  None visible   [] Abnormal -     HENT: [x] Normocephalic, atraumatic  [] Abnormal -   [x] Mouth/Throat: Mucous membranes are moist    External Ears [x] Normal  [] Abnormal -    Neck: [x] No visualized mass [] Abnormal -     Pulmonary/Chest: [x] Respiratory effort normal   [x] No visualized signs of difficulty breathing or respiratory distress        [] Abnormal -      Musculoskeletal:   [x] Normal gait with no signs of ataxia         [x] Normal range of motion of neck        [] Abnormal -     Neurological:        [x] No Facial Asymmetry (Cranial nerve 7 motor function) (limited exam due to video visit)          [x] No gaze palsy        [] Abnormal -          Skin:        [x] No significant exanthematous lesions or discoloration noted on facial skin         [] Abnormal -            Psychiatric:       [x] Normal Affect [] Abnormal -        [x] No Hallucinations    Other pertinent observable physical exam findings:-        We discussed the expected course, resolution and complications of the diagnosis(es) in detail. Medication risks, benefits, costs, interactions, and alternatives were discussed as indicated. I advised him to contact the office if his condition worsens, changes or fails to improve as anticipated.  He expressed understanding with the diagnosis(es) and plan. Isabel Willingham is a 80 y.o. male who was evaluated by a video visit encounter for concerns as above. Patient identification was verified prior to start of the visit. A caregiver was present when appropriate. Due to this being a TeleHealth encounter (During [facility]-07 public health emergency), evaluation of the following organ systems was limited: Vitals/Constitutional/EENT/Resp/CV/GI//MS/Neuro/Skin/Heme-Lymph-Imm. Pursuant to the emergency declaration under the [address] [place] [address], [facility] waiver authority and the [employer] Act, this Virtual  Visit was conducted, with patient's (and/or legal guardian's) consent, to reduce the patient's risk of exposure to COVID-19 and provide necessary medical care. Services were provided through a video synchronous discussion virtually to substitute for in-person clinic visit. Patient and provider were located at their individual homes.       Maurice Ayala NP

## 2021-06-02 ENCOUNTER — TELEPHONE (OUTPATIENT)
Dept: FAMILY MEDICINE CLINIC | Age: 86
End: 2021-06-02

## 2021-06-02 DIAGNOSIS — R44.3 HALLUCINATION: Primary | ICD-10-CM

## 2021-06-02 NOTE — TELEPHONE ENCOUNTER
Patient's daughter states her dad is starting to have hallucinations. She says yesterday was a bad day, he had them all day long. She is wondering if there is a medication that would help with this. Please advise.

## 2021-07-29 ENCOUNTER — HOSPITAL ENCOUNTER (OUTPATIENT)
Dept: LAB | Age: 86
Discharge: HOME | End: 2021-07-29

## 2021-07-29 LAB
ANION GAP SERPL CALC-SCNC: 9 MMOL/L (ref 3–18)
BASOPHILS # BLD: 0 K/UL (ref 0–0.1)
BASOPHILS NFR BLD: 0 % (ref 0–2)
BUN SERPL-MCNC: 37 MG/DL (ref 7–18)
BUN/CREAT SERPL: 32 (ref 12–20)
CALCIUM SERPL-MCNC: 8.8 MG/DL (ref 8.5–10.1)
CHLORIDE SERPL-SCNC: 109 MMOL/L (ref 100–111)
CO2 SERPL-SCNC: 25 MMOL/L (ref 21–32)
CREAT SERPL-MCNC: 1.16 MG/DL (ref 0.6–1.3)
DIFFERENTIAL METHOD BLD: ABNORMAL
EOSINOPHIL # BLD: 0 K/UL (ref 0–0.4)
EOSINOPHIL NFR BLD: 1 % (ref 0–5)
ERYTHROCYTE [DISTWIDTH] IN BLOOD BY AUTOMATED COUNT: 13.5 % (ref 11.6–14.5)
GLUCOSE SERPL-MCNC: 82 MG/DL (ref 74–99)
HCT VFR BLD AUTO: 37.5 % (ref 36–48)
HGB BLD-MCNC: 12 G/DL (ref 13–16)
LYMPHOCYTES # BLD: 0.5 K/UL (ref 0.9–3.6)
LYMPHOCYTES NFR BLD: 10 % (ref 21–52)
MCH RBC QN AUTO: 33.9 PG (ref 24–34)
MCHC RBC AUTO-ENTMCNC: 32 G/DL (ref 31–37)
MCV RBC AUTO: 105.9 FL (ref 74–97)
MONOCYTES # BLD: 0.6 K/UL (ref 0.05–1.2)
MONOCYTES NFR BLD: 13 % (ref 3–10)
NEUTS SEG # BLD: 3.7 K/UL (ref 1.8–8)
NEUTS SEG NFR BLD: 75 % (ref 40–73)
PLATELET # BLD AUTO: 105 K/UL (ref 135–420)
PMV BLD AUTO: 12 FL (ref 9.2–11.8)
POTASSIUM SERPL-SCNC: 3.8 MMOL/L (ref 3.5–5.5)
RBC # BLD AUTO: 3.54 M/UL (ref 4.35–5.65)
SODIUM SERPL-SCNC: 143 MMOL/L (ref 136–145)
WBC # BLD AUTO: 4.9 K/UL (ref 4.6–13.2)

## 2021-07-29 PROCEDURE — 85025 COMPLETE CBC W/AUTO DIFF WBC: CPT

## 2021-07-29 PROCEDURE — 80048 BASIC METABOLIC PNL TOTAL CA: CPT

## 2021-08-03 ENCOUNTER — TELEPHONE (OUTPATIENT)
Dept: FAMILY MEDICINE CLINIC | Age: 86
End: 2021-08-03

## 2021-08-03 NOTE — TELEPHONE ENCOUNTER
Patient is in [facility] and his daughter is wanting to transfer him to [facility] where she can get in to see him. She has brought a form in to be filled out for [facility]. He's unable to do a virtual due to her not being able to get in to see him and help him. Please advise.